# Patient Record
Sex: MALE | Employment: OTHER | URBAN - METROPOLITAN AREA
[De-identification: names, ages, dates, MRNs, and addresses within clinical notes are randomized per-mention and may not be internally consistent; named-entity substitution may affect disease eponyms.]

---

## 2019-10-02 ENCOUNTER — OFFICE VISIT (OUTPATIENT)
Dept: HEMATOLOGY/ONCOLOGY | Facility: CLINIC | Age: 84
End: 2019-10-02
Payer: MEDICARE

## 2019-10-02 VITALS
TEMPERATURE: 98 F | RESPIRATION RATE: 16 BRPM | HEIGHT: 69 IN | WEIGHT: 153.38 LBS | HEART RATE: 56 BPM | OXYGEN SATURATION: 97 % | SYSTOLIC BLOOD PRESSURE: 162 MMHG | BODY MASS INDEX: 22.72 KG/M2 | DIASTOLIC BLOOD PRESSURE: 58 MMHG

## 2019-10-02 DIAGNOSIS — C34.02 MALIGNANT NEOPLASM OF HILUS OF LEFT LUNG: Primary | ICD-10-CM

## 2019-10-02 PROCEDURE — 99204 OFFICE O/P NEW MOD 45 MIN: CPT | Mod: ICN,CMP,S$GLB, | Performed by: INTERNAL MEDICINE

## 2019-10-02 PROCEDURE — 99204 PR OFFICE/OUTPT VISIT, NEW, LEVL IV, 45-59 MIN: ICD-10-PCS | Mod: ICN,CMP,S$GLB, | Performed by: INTERNAL MEDICINE

## 2019-10-02 RX ORDER — OXYCODONE AND ACETAMINOPHEN 5; 325 MG/1; MG/1
TABLET ORAL
Refills: 0 | COMMUNITY
Start: 2019-09-12 | End: 2019-12-05

## 2019-10-02 RX ORDER — GABAPENTIN 100 MG/1
CAPSULE ORAL
Refills: 0 | COMMUNITY
Start: 2019-08-23

## 2019-10-02 RX ORDER — TRAMADOL HYDROCHLORIDE 50 MG/1
50 TABLET ORAL EVERY 8 HOURS PRN
COMMUNITY
End: 2019-12-05

## 2019-10-02 RX ORDER — TAMSULOSIN HYDROCHLORIDE 0.4 MG/1
CAPSULE ORAL
Refills: 2 | COMMUNITY
Start: 2019-08-12

## 2019-10-02 NOTE — PROGRESS NOTES
HISTORY AND PHYSICAL NOTE  Hematology Oncology  [facility]    Date of Service: 10/2/2019    PATIENT: Gerry Powell  : 1935 83 y.o. male  MRN [id number]     PCP: Jayce Guerra MD  Consult Requested By:  No att. providers found    Date of Service: 10/2/2019  -----------------------------------------------------    Chief Complaint: Lung Cancer      History of Present Illness     This is a 83 y.o. male patient with a history of There were no encounter diagnoses., who is referred to Hematology Oncology for newly diagnosed non-small cell lung cancer.  The patient's was incidentally found to have a left lung mass on a CT scan prior to planned hernia surgery.  Upon further questioning, the patient stated that he has left lower chest pain for several months which is not severe.  He also lost about 25 lb in the past several years.  The patient denies coughing short of breath.  He has no headache.    The patient underwent bronchoscopy with biopsy which shows non-small-cell lung cancer and no further classification was given.  A PET scan was ordered which shows a large hypermetabolic next chronic lesion in the left lower lobe measuring 10 cm and encasing the descending aorta and invading the healing a.m. and mediastinum.  There was also 1 cm hypermetabolic lesion in the right neck close to the thyroid.  MRI of the brain demonstrated no evidence of intracranial metastatic disease unfortunately he has the asymptomatic infarct.    The patient is ex-smoker and decreased smoking .    Oncologic History:      Oncologic History     Oncologic Treatment     Pathology      Oncology History:   No history exists.        Past Medical History:   Past Medical History:   Diagnosis Date    Arthritis     Cataract     removed     GERD (gastroesophageal reflux disease)     Heart murmur     Neuromuscular disorder     neuropathy in feet    Prostate cancer     Radiation seeds implanted    Stroke     patient  unaware when this happened. Showed on MRI       Past Surgical HIstory:   Past Surgical History:   Procedure Laterality Date    CATARACT EXTRACTION, BILATERAL  2011    HERNIA REPAIR  2016       Allergies:  Review of patient's allergies indicates:  No Known Allergies    Medications:  Current Outpatient Medications   Medication Sig Dispense Refill    gabapentin (NEURONTIN) 100 MG capsule TK TWO CS PO  TID  0    oxyCODONE-acetaminophen (PERCOCET) 5-325 mg per tablet TK 1 TO 2 TS PO Q 6 H PRN P  0    tamsulosin (FLOMAX) 0.4 mg Cap TK 1 C PO QD  2    traMADol (ULTRAM) 50 mg tablet Take 50 mg by mouth every 8 (eight) hours as needed for Pain.       No current facility-administered medications for this visit.        Family History:   Family History   Problem Relation Age of Onset    Colon cancer Mother     Kidney cancer Father     Bladder Cancer Father     Lung cancer Sister     Lymphoma Brother     No Known Problems Maternal Aunt     No Known Problems Maternal Uncle     No Known Problems Paternal Aunt     No Known Problems Paternal Uncle     No Known Problems Maternal Grandmother     No Known Problems Maternal Grandfather     No Known Problems Paternal Grandmother     No Known Problems Paternal Grandfather     Cancer Brother        Social History:  reports that he quit smoking about 34 years ago. His smoking use included cigarettes. He started smoking about 64 years ago. He has a 30.00 pack-year smoking history. He has never used smokeless tobacco. He reports that he does not drink alcohol or use drugs.    Review of Systemas  Constitutional: No change in weight, appetie, fatigue, fever, or night sweats  Eyes: No changes in vision  Ears, Nose, Mouth, Throat, and Face: No hearing problems, ear pain, rummy nose, or sore throat  Respiratory:  See HPI     Cardiovascular: No chest pain, palpitations or dizziness  Gastrointestinal: No abdominal pain, hematochezia, melena  Genitourinary: No dysuria, hematuria,  "nocturia, menstrual problems, post menopausal  Integumentary/Breast: No rashes or itching  Hematologic/Lymphatic: No anemia or bleeding abnormalities  Musculoskeletal: No joint or muscle abnormalities  Neurological: No sensory or motor problems, no headaches  Behavioral/Psych: No depression, anxiety, cognitive problems, or stress  Endocrine: No diabetes or thyroid problems  Allergic/Immunologic: See allergy above    Physical Exam      Vitals:   Vitals:    10/02/19 1001   BP: (!) 162/58   BP Location: Left arm   Patient Position: Sitting   BP Method: Large (Automatic)   Pulse: (!) 56   Resp: 16   Temp: 98.4 °F (36.9 °C)   TempSrc: Temporal   SpO2: 97%   Weight: 69.6 kg (153 lb 6.4 oz)   Height: 5' 9" (1.753 m)     BMI: Body mass index is 22.65 kg/m².  BSA Body surface area is 1.84 meters squared.  ECOG Performance Status:   ECOG SCORE         GENERAL APPEARANCE: Well developed, well nourished, in no acute distress.  SKIN: Inspection of the skin reveals no rashes, ulcerations or petechiae.  HEENT: The sclerae were anicteric and conjunctivae were pink and moist. Extraocular movements were intact and pupils were equal, round, and reactive to light with normal accommodation. External inspection of the ears and nose showed no scars, lesions, or masses. Lips, teeth, and gums showed normal mucosa. The oral mucosa, hard and soft palate, tongue and posterior pharynx were normal.  NECK: Supple and symmetric. There was no thyroid enlargement, and no tenderness, or masses were felt.  CRESPIRATORY: Normal AP diameter and normal contour without any kyphoscoliosis. LUNGS: Auscultation of the lungs revealed normal breath sounds without any other adventitious sounds or rubs.  CARDIOVASCULAR: There was a regular rate and rhythm without any murmurs, gallops, rubs. The carotid pulses were normal and 2+ bilaterally without bruits. Peripheral pulses were 2+ and symmetric.  GASTROINTESTINAL: Soft and nontender with normal bowel sounds. The " liver span was approximately 5-6 cm in the right midclavicular line by percussion. The liver edge was nontender. The spleen was not palpable. There were no inguinal or umbilical hernias noted. No ascites was noted.  LYMPH NODES: No lymphadenopathy was appreciated in the neck, axillae or groin.  MUSCULOSKELETAL: Gait was normal. There was no tenderness or effusions noted. Muscle strength and tone were normal. EXTREMITIES: No cyanosis, clubbing or edema.  NEUROLOGIC: Alert and oriented x 3. Normal affect. Gait was normal. Normal deep tendon reflexes with no pathological reflexes. Sensation to touch was normal.  PSYCHIATRIC: good mood, orientated to place, time and person     Labs and Imagings     No visits with results within 12 Month(s) from this visit.   Latest known visit with results is:   No results found for any previous visit.       Imaging:     Assessment:     Principle Problem: No primary diagnosis found.   Co-morbidity/Active Problems: There is no problem list on file for this patient.       Gerry Powell is a 83 y.o. male with PMH of There were no encounter diagnoses., with No primary diagnosis found.   Non-small-cell lung cancer.  Current staging is stage IIIA not resectable.  The right neck lesion is suspicious and biopsy is planned.  If the biopsy is consistent with metastatic lung cancer the patient will be stage IV.  We can request more genetic testing from the neck pathology if that proven to be metastatic lung cancer.    The patient have a good performance status on less significant weight loss    Plan:   --the overall plan will be concurrent chemo radiation therapy if pathological staging of the neck proven the patient have no distant metastatic disease.    If the patient have stage IV metastatic disease, we will request more genetic testing and PDL1 staining to determine the patient will be candidate for tyrosine kinase inhibitors versus checkpoint inhibitors.  --the patient may go through hernia  repair 1st before starting chemotherapy and radiation therapy.  --I had a thorough discussion regarding the overall treatment plan as well prognosis based on limited information with the patient and family.  They verbalize understanding and agreed to proceed.  --to MA Return to Clinic with appointment in 2 weeks      Signature    William Arguello M.D., Ph.D., [facility]  Hematology-Oncology    Signed 10/2/2019 11:47 AM   Note Ends Here

## 2019-10-16 ENCOUNTER — OFFICE VISIT (OUTPATIENT)
Dept: HEMATOLOGY/ONCOLOGY | Facility: CLINIC | Age: 84
End: 2019-10-16
Payer: MEDICARE

## 2019-10-16 VITALS
OXYGEN SATURATION: 97 % | BODY MASS INDEX: 23.4 KG/M2 | HEIGHT: 69 IN | SYSTOLIC BLOOD PRESSURE: 152 MMHG | DIASTOLIC BLOOD PRESSURE: 70 MMHG | TEMPERATURE: 98 F | RESPIRATION RATE: 12 BRPM | WEIGHT: 158 LBS | HEART RATE: 53 BPM

## 2019-10-16 DIAGNOSIS — C34.11 MALIGNANT NEOPLASM OF UPPER LOBE OF RIGHT LUNG: ICD-10-CM

## 2019-10-16 DIAGNOSIS — C34.02 MALIGNANT NEOPLASM OF HILUS OF LEFT LUNG: Primary | ICD-10-CM

## 2019-10-16 PROCEDURE — 99215 OFFICE O/P EST HI 40 MIN: CPT | Mod: S$GLB,,, | Performed by: INTERNAL MEDICINE

## 2019-10-16 PROCEDURE — 99215 PR OFFICE/OUTPT VISIT, EST, LEVL V, 40-54 MIN: ICD-10-PCS | Mod: S$GLB,,, | Performed by: INTERNAL MEDICINE

## 2019-10-16 RX ORDER — HYDROCODONE BITARTRATE AND ACETAMINOPHEN 10; 325 MG/1; MG/1
TABLET ORAL
Refills: 0 | COMMUNITY
Start: 2019-10-07 | End: 2019-12-27

## 2019-10-16 NOTE — PROGRESS NOTES
Hematology Oncology Progress Note    Hematology Oncology  [facility]     PATIENT: Gerry Powell  : 1935 84 y.o. male  MRN [id number]     PCP: Jayce Guerra MD  Consult Requested By:  No att. providers found    Date of Service: 10/16/2019    Subjective:   Interim History:  maligant neoplasm of hilus of left lung    Gerry Powell is here for to followup non-small-cell lung cancer.  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    Oncology History:  This is a 83 y.o. male patient with a history of There were no encounter diagnoses., who is referred to Hematology Oncology for newly diagnosed non-small cell lung cancer.  The patient's was incidentally found to have a left lung mass on a CT scan prior to planned hernia surgery.  Upon further questioning, the patient stated that he has left lower chest pain for several months which is not severe.  He also lost about 25 lb in the past several years.  The patient denies coughing short of breath.  He has no headache.     The patient underwent bronchoscopy with biopsy which shows non-small-cell lung cancer and no further classification was given.  A PET scan was ordered which shows a large hypermetabolic next chronic lesion in the left lower lobe measuring 10 cm and encasing the descending aorta and invading the healing a.m. and mediastinum.  There was also 1 cm hypermetabolic lesion in the right neck close to the thyroid.  MRI of the brain demonstrated no evidence of intracranial metastatic disease unfortunately he has the asymptomatic infarct.     The patient is ex-smoker and decreased smoking 1985.     Malignant neoplasm of upper lobe of right lung    10/16/2019 Initial Diagnosis     Malignant neoplasm of upper lobe of right lung      10/22/2019 -  Chemotherapy     Treatment Summary   Plan Name: OP NSCLC PACLITAXEL + CARBOPLATIN (AUC) QW + RADIATION  Treatment Goal: Curative  Status: Active  Start Date: 10/22/2019 (Planned)  End Date:  11/26/2019 (Planned)  Provider: Jeny Ordoñez NP  Chemotherapy: CARBOplatin (PARAPLATIN) in sodium chloride 0.9% 250 mL chemo infusion, , Intravenous, Clinic/HOD 1 time, 0 of 6 cycles  PACLitaxel (TAXOL) 45 mg/m2 = 84 mg in sodium chloride 0.9% 250 mL chemo infusion, 45 mg/m2, Intravenous, Clinic/HOD 1 time, 0 of 6 cycles         Oncologic History:      Oncologic History     Oncologic Treatment     Pathology      Past Medical History:   Past Medical History:   Diagnosis Date    Arthritis     Cataract     removed 2011    GERD (gastroesophageal reflux disease)     Heart murmur     Lung cancer 09/2019    Neuromuscular disorder     neuropathy in feet    Prostate cancer 2007    Radiation seeds implanted    Stroke     patient unaware when this happened. Showed on MRI       Past Surgical HIstory:   Past Surgical History:   Procedure Laterality Date    CATARACT EXTRACTION, BILATERAL  2011    HERNIA REPAIR  2016       Allergies:  Review of patient's allergies indicates:  No Known Allergies    Medications:  Current Outpatient Medications   Medication Sig Dispense Refill    gabapentin (NEURONTIN) 100 MG capsule TK TWO CS PO  TID  0    HYDROcodone-acetaminophen (NORCO)  mg per tablet TK 1 T PO  Q 6 H PRN P  0    tamsulosin (FLOMAX) 0.4 mg Cap TK 1 C PO QD  2    traMADol (ULTRAM) 50 mg tablet Take 50 mg by mouth every 8 (eight) hours as needed for Pain.      oxyCODONE-acetaminophen (PERCOCET) 5-325 mg per tablet TK 1 TO 2 TS PO Q 6 H PRN P  0     No current facility-administered medications for this visit.        Family History:   Family History   Problem Relation Age of Onset    Colon cancer Mother     Kidney cancer Father     Bladder Cancer Father     Lung cancer Sister     Lymphoma Brother     No Known Problems Maternal Aunt     No Known Problems Maternal Uncle     No Known Problems Paternal Aunt     No Known Problems Paternal Uncle     No Known Problems Maternal Grandmother     No Known  "Problems Maternal Grandfather     No Known Problems Paternal Grandmother     No Known Problems Paternal Grandfather     Cancer Brother        Social History:  reports that he quit smoking about 34 years ago. His smoking use included cigarettes. He started smoking about 64 years ago. He has a 30.00 pack-year smoking history. He has never used smokeless tobacco. He reports that he does not drink alcohol or use drugs.    Review of Systemas  Constitutional: No change in weight, appetie, fatigue, fever, or night sweats  Eyes: No changes in vision  Ears, Nose, Mouth, Throat, and Face: No hearing problems, ear pain, rummy nose, or sore throat  Respiratory: No shortness of breath or cough  Cardiovascular: No chest pain, palpitations or dizziness  Gastrointestinal: No abdominal pain, hematochezia, melena  Genitourinary: No dysuria, hematuria, nocturia, menstrual problems, post menopausal  Integumentary/Breast: No rashes or itching  Hematologic/Lymphatic: No anemia or bleeding abnormalities  Musculoskeletal: No joint or muscle abnormalities  Neurological: No sensory or motor problems, no headaches  Behavioral/Psych: No depression, anxiety, cognitive problems, or stress  Endocrine: No diabetes or thyroid problems  Allergic/Immunologic: See allergy above    Physical Exam      Vitals:   Vitals:    10/16/19 0949   BP: (!) 152/70   BP Location: Right arm   Patient Position: Sitting   BP Method: Large (Automatic)   Pulse: (!) 53   Resp: 12   Temp: 97.5 °F (36.4 °C)   TempSrc: Temporal   SpO2: 97%   Weight: 71.7 kg (158 lb)   Height: 5' 9" (1.753 m)     BMI: Body mass index is 23.33 kg/m².  BSA Body surface area is 1.87 meters squared.  ECOG Performance Status:   ECOG SCORE    0 - Fully active-able to carry on all pre-disease performance without restriction       GENERAL APPEARANCE: Well developed, well nourished, in no acute distress.  SKIN: Inspection of the skin reveals no rashes, ulcerations or petechiae.  HEENT: The sclerae " were anicteric and conjunctivae were pink and moist. Extraocular movements were intact and pupils were equal, round, and reactive to light with normal accommodation. External inspection of the ears and nose showed no scars, lesions, or masses. Lips, teeth, and gums showed normal mucosa. The oral mucosa, hard and soft palate, tongue and posterior pharynx were normal.  NECK: Supple and symmetric. There was no thyroid enlargement, and no tenderness, or masses were felt.  CRESPIRATORY: Normal AP diameter and normal contour without any kyphoscoliosis. LUNGS: Auscultation of the lungs revealed normal breath sounds without any other adventitious sounds or rubs.  CARDIOVASCULAR: There was a regular rate and rhythm without any murmurs, gallops, rubs. The carotid pulses were normal and 2+ bilaterally without bruits. Peripheral pulses were 2+ and symmetric.  GASTROINTESTINAL: Soft and nontender with normal bowel sounds. The liver span was approximately 5-6 cm in the right midclavicular line by percussion. The liver edge was nontender. The spleen was not palpable. There were no inguinal or umbilical hernias noted. No ascites was noted.  LYMPH NODES: No lymphadenopathy was appreciated in the neck, axillae or groin.  MUSCULOSKELETAL: Gait was normal. There was no tenderness or effusions noted. Muscle strength and tone were normal. EXTREMITIES: No cyanosis, clubbing or edema.  NEUROLOGIC: Alert and oriented x 3. Normal affect. Gait was normal. Normal deep tendon reflexes with no pathological reflexes. Sensation to touch was normal.  PSYCHIATRIC: good mood, orientated to place, time and person     Labs and Imagings     No visits with results within 12 Month(s) from this visit.   Latest known visit with results is:   No results found for any previous visit.       Imaging:     Assessment:     Principle Problem: Malignant neoplasm of hilus of left lung [C34.02]   Co-morbidity/Active Problems:   Patient Active Problem List   Diagnosis     Malignant neoplasm of upper lobe of right lung        Gerry Powell is a 84 y.o. male with PMH of The primary encounter diagnosis was Malignant neoplasm of hilus of left lung. A diagnosis of Malignant neoplasm of upper lobe of right lung was also pertinent to this visit., with Malignant neoplasm of hilus of left lung [C34.02]   Non-small-cell lung cancer with EGFR ALK Ros1 negative molecular features; there was no enough specimen to stain PDL1    There was no distant metastatic disease and the biopsy of steroids shows papillary carcinoma      Plan:   Overall Plan:  Chemoradiation therapy followed by adjuvant immunotherapy    To Do:    --Labs: CBC CMP every week during chemotherapy    --Proceed with Rx carboplatin and Taxol weekly  --Return to Clinic with appointment in 1 week  Non-small-cell lung cancer  Signature    William Arguello M.D., Ph.D., [facility]  Hematology-Oncology    Signed 10/16/2019 3:52 PM   Note Ends Here

## 2019-10-23 ENCOUNTER — OFFICE VISIT (OUTPATIENT)
Dept: HEMATOLOGY/ONCOLOGY | Facility: CLINIC | Age: 84
End: 2019-10-23
Payer: MEDICARE

## 2019-10-23 VITALS
BODY MASS INDEX: 23.49 KG/M2 | TEMPERATURE: 98 F | SYSTOLIC BLOOD PRESSURE: 166 MMHG | DIASTOLIC BLOOD PRESSURE: 74 MMHG | HEART RATE: 56 BPM | HEIGHT: 69 IN | WEIGHT: 158.63 LBS | OXYGEN SATURATION: 98 % | RESPIRATION RATE: 14 BRPM

## 2019-10-23 DIAGNOSIS — C34.11 MALIGNANT NEOPLASM OF UPPER LOBE OF RIGHT LUNG: Primary | ICD-10-CM

## 2019-10-23 PROCEDURE — 99215 PR OFFICE/OUTPT VISIT, EST, LEVL V, 40-54 MIN: ICD-10-PCS | Mod: S$GLB,,, | Performed by: INTERNAL MEDICINE

## 2019-10-23 PROCEDURE — 99215 OFFICE O/P EST HI 40 MIN: CPT | Mod: S$GLB,,, | Performed by: INTERNAL MEDICINE

## 2019-10-23 RX ORDER — FAMOTIDINE 10 MG/ML
20 INJECTION INTRAVENOUS
Status: CANCELLED | OUTPATIENT
Start: 2019-10-23

## 2019-10-23 RX ORDER — EPINEPHRINE 0.3 MG/.3ML
0.3 INJECTION SUBCUTANEOUS ONCE AS NEEDED
Status: CANCELLED | OUTPATIENT
Start: 2019-10-23

## 2019-10-23 RX ORDER — HEPARIN 100 UNIT/ML
500 SYRINGE INTRAVENOUS
Status: CANCELLED | OUTPATIENT
Start: 2019-10-23

## 2019-10-23 RX ORDER — DIPHENHYDRAMINE HYDROCHLORIDE 50 MG/ML
50 INJECTION INTRAMUSCULAR; INTRAVENOUS ONCE AS NEEDED
Status: CANCELLED | OUTPATIENT
Start: 2019-10-23

## 2019-10-23 RX ORDER — SODIUM CHLORIDE 0.9 % (FLUSH) 0.9 %
10 SYRINGE (ML) INJECTION
Status: CANCELLED | OUTPATIENT
Start: 2019-10-23

## 2019-10-23 NOTE — PROGRESS NOTES
Hematology Oncology Progress Note    Hematology Oncology  [facility]     PATIENT: Gerry Powell  : 1935 84 y.o. male  MRN [id number]     PCP: Jayce Guerra MD  Consult Requested By:  No att. providers found    Date of Service: 10/23/2019    Subjective:   Interim History:  Lung Cancer (Hilus of left lung )    Gerry Powell is here for to followup to start chemoxrt. He went to Rad Onc today    Oncology History:     Malignant neoplasm of upper lobe of right lung    10/16/2019 Initial Diagnosis     Malignant neoplasm of upper lobe of right lung      10/23/2019 -  Chemotherapy     Treatment Summary   Plan Name: OP NSCLC PACLITAXEL + CARBOPLATIN (AUC) QW + RADIATION  Treatment Goal: Curative  Status: Active  Start Date: 10/23/2019 (Planned)  End Date: 2019 (Planned)  Provider: Jeny Ordoñez NP  Chemotherapy: CARBOplatin (PARAPLATIN) 165 mg in sodium chloride 0.9% 250 mL chemo infusion, 165 mg (original dose ), Intravenous, Clinic/HOD 1 time, 0 of 6 cycles  Dose modification:   (Cycle 1, Reason: MD Discretion)  PACLitaxel (TAXOL) 45 mg/m2 = 84 mg in sodium chloride 0.9% 250 mL chemo infusion, 45 mg/m2 = 84 mg, Intravenous, Clinic/HOD 1 time, 0 of 6 cycles         Oncologic History:   This is a 83 y.o. male patient with a history of There were no encounter diagnoses., who is referred to Hematology Oncology for newly diagnosed non-small cell lung cancer.  The patient's was incidentally found to have a left lung mass on a CT scan prior to planned hernia surgery.  Upon further questioning, the patient stated that he has left lower chest pain for several months which is not severe.  He also lost about 25 lb in the past several years.  The patient denies coughing short of breath.  He has no headache.     The patient underwent bronchoscopy with biopsy which shows non-small-cell lung cancer and no further classification was given.  A PET scan was ordered which shows a large hypermetabolic next chronic  lesion in the left lower lobe measuring 10 cm and encasing the descending aorta and invading the healing a.m. and mediastinum.  There was also 1 cm hypermetabolic lesion in the right neck close to the thyroid.  MRI of the brain demonstrated no evidence of intracranial metastatic disease unfortunately he has the asymptomatic infarct.     The patient is ex-smoker and decreased smoking 1985.                Oncologic History     Oncologic Treatment     Pathology      Past Medical History:   Past Medical History:   Diagnosis Date    Arthritis     Cataract     removed 2011    GERD (gastroesophageal reflux disease)     Heart murmur     Lung cancer 09/2019    Neuromuscular disorder     neuropathy in feet    Prostate cancer 2007    Radiation seeds implanted    Stroke     patient unaware when this happened. Showed on MRI       Past Surgical HIstory:   Past Surgical History:   Procedure Laterality Date    CATARACT EXTRACTION, BILATERAL  2011    HERNIA REPAIR  2016       Allergies:  Review of patient's allergies indicates:  No Known Allergies    Medications:  Current Outpatient Medications   Medication Sig Dispense Refill    gabapentin (NEURONTIN) 100 MG capsule TK TWO CS PO  TID  0    HYDROcodone-acetaminophen (NORCO)  mg per tablet TK 1 T PO  Q 6 H PRN P  0    oxyCODONE-acetaminophen (PERCOCET) 5-325 mg per tablet TK 1 TO 2 TS PO Q 6 H PRN P  0    tamsulosin (FLOMAX) 0.4 mg Cap TK 1 C PO QD  2    traMADol (ULTRAM) 50 mg tablet Take 50 mg by mouth every 8 (eight) hours as needed for Pain.       No current facility-administered medications for this visit.        Family History:   Family History   Problem Relation Age of Onset    Colon cancer Mother     Kidney cancer Father     Bladder Cancer Father     Lung cancer Sister     Lymphoma Brother     No Known Problems Maternal Aunt     No Known Problems Maternal Uncle     No Known Problems Paternal Aunt     No Known Problems Paternal Uncle     No  "Known Problems Maternal Grandmother     No Known Problems Maternal Grandfather     No Known Problems Paternal Grandmother     No Known Problems Paternal Grandfather     Cancer Brother        Social History:  reports that he quit smoking about 34 years ago. His smoking use included cigarettes. He started smoking about 64 years ago. He has a 30.00 pack-year smoking history. He has never used smokeless tobacco. He reports that he does not drink alcohol or use drugs.    Review of Systemas  Constitutional: No change in weight, appetie, fatigue, fever, or night sweats  Eyes: No changes in vision  Ears, Nose, Mouth, Throat, and Face: No hearing problems, ear pain, rummy nose, or sore throat  Respiratory: No shortness of breath or cough  Cardiovascular: No chest pain, palpitations or dizziness  Gastrointestinal: No abdominal pain, hematochezia, melena  Genitourinary: No dysuria, hematuria, nocturia, menstrual problems, post menopausal  Integumentary/Breast: No rashes or itching  Hematologic/Lymphatic: No anemia or bleeding abnormalities  Musculoskeletal: No joint or muscle abnormalities  Neurological: No sensory or motor problems, no headaches  Behavioral/Psych: No depression, anxiety, cognitive problems, or stress  Endocrine: No diabetes or thyroid problems  Allergic/Immunologic: See allergy above    Physical Exam      Vitals:   Vitals:    10/23/19 0913   BP: (!) 166/74   BP Location: Left arm   Patient Position: Sitting   BP Method: Large (Automatic)   Pulse: (!) 56   Resp: 14   Temp: 97.7 °F (36.5 °C)   TempSrc: Temporal   SpO2: 98%   Weight: 71.9 kg (158 lb 9.6 oz)   Height: 5' 9" (1.753 m)     BMI: Body mass index is 23.42 kg/m².  BSA Body surface area is 1.87 meters squared.  ECOG Performance Status:   ECOG SCORE    0 - Fully active-able to carry on all pre-disease performance without restriction       GENERAL APPEARANCE: Well developed, well nourished, in no acute distress.  SKIN: Inspection of the skin reveals " no rashes, ulcerations or petechiae.  HEENT: The sclerae were anicteric and conjunctivae were pink and moist. Extraocular movements were intact and pupils were equal, round, and reactive to light with normal accommodation. External inspection of the ears and nose showed no scars, lesions, or masses. Lips, teeth, and gums showed normal mucosa. The oral mucosa, hard and soft palate, tongue and posterior pharynx were normal.  NECK: Supple and symmetric. There was no thyroid enlargement, and no tenderness, or masses were felt.  CRESPIRATORY: Normal AP diameter and normal contour without any kyphoscoliosis. LUNGS: Auscultation of the lungs revealed normal breath sounds without any other adventitious sounds or rubs.  CARDIOVASCULAR: There was a regular rate and rhythm without any murmurs, gallops, rubs. The carotid pulses were normal and 2+ bilaterally without bruits. Peripheral pulses were 2+ and symmetric.  GASTROINTESTINAL: Soft and nontender with normal bowel sounds. The liver span was approximately 5-6 cm in the right midclavicular line by percussion. The liver edge was nontender. The spleen was not palpable. There were no inguinal or umbilical hernias noted. No ascites was noted.  LYMPH NODES: No lymphadenopathy was appreciated in the neck, axillae or groin.  MUSCULOSKELETAL: Gait was normal. There was no tenderness or effusions noted. Muscle strength and tone were normal. EXTREMITIES: No cyanosis, clubbing or edema.  NEUROLOGIC: Alert and oriented x 3. Normal affect. Gait was normal. Normal deep tendon reflexes with no pathological reflexes. Sensation to touch was normal.  PSYCHIATRIC: good mood, orientated to place, time and person     Labs and Imagings     No visits with results within 12 Month(s) from this visit.   Latest known visit with results is:   No results found for any previous visit.       Imaging:     Assessment:     Principle Problem: Malignant neoplasm of upper lobe of right lung [C34.11]    Co-morbidity/Active Problems:   Patient Active Problem List   Diagnosis    Malignant neoplasm of upper lobe of right lung        Gerry Powell is a 84 y.o. 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

## 2019-10-28 DIAGNOSIS — C34.11 MALIGNANT NEOPLASM OF UPPER LOBE OF RIGHT LUNG: Primary | ICD-10-CM

## 2019-10-28 RX ORDER — ONDANSETRON HYDROCHLORIDE 8 MG/1
8 TABLET, FILM COATED ORAL EVERY 8 HOURS PRN
Qty: 30 TABLET | Refills: 2 | Status: SHIPPED | OUTPATIENT
Start: 2019-10-28 | End: 2020-10-27

## 2019-10-30 ENCOUNTER — OFFICE VISIT (OUTPATIENT)
Dept: HEMATOLOGY/ONCOLOGY | Facility: CLINIC | Age: 84
End: 2019-10-30
Payer: MEDICARE

## 2019-10-30 VITALS
OXYGEN SATURATION: 97 % | TEMPERATURE: 98 F | HEIGHT: 69 IN | WEIGHT: 156 LBS | BODY MASS INDEX: 23.11 KG/M2 | RESPIRATION RATE: 10 BRPM | HEART RATE: 70 BPM | SYSTOLIC BLOOD PRESSURE: 139 MMHG | DIASTOLIC BLOOD PRESSURE: 72 MMHG

## 2019-10-30 DIAGNOSIS — C34.11 MALIGNANT NEOPLASM OF UPPER LOBE OF RIGHT LUNG: Primary | ICD-10-CM

## 2019-10-30 PROCEDURE — 99215 PR OFFICE/OUTPT VISIT, EST, LEVL V, 40-54 MIN: ICD-10-PCS | Mod: S$GLB,,, | Performed by: INTERNAL MEDICINE

## 2019-10-30 PROCEDURE — 99215 OFFICE O/P EST HI 40 MIN: CPT | Mod: S$GLB,,, | Performed by: INTERNAL MEDICINE

## 2019-10-30 RX ORDER — FAMOTIDINE 10 MG/ML
20 INJECTION INTRAVENOUS
Status: CANCELLED | OUTPATIENT
Start: 2019-10-30

## 2019-10-30 RX ORDER — HEPARIN 100 UNIT/ML
500 SYRINGE INTRAVENOUS
Status: CANCELLED | OUTPATIENT
Start: 2019-10-30

## 2019-10-30 RX ORDER — EPINEPHRINE 0.3 MG/.3ML
0.3 INJECTION SUBCUTANEOUS ONCE AS NEEDED
Status: CANCELLED | OUTPATIENT
Start: 2019-10-30

## 2019-10-30 RX ORDER — FENTANYL 25 UG/1
PATCH TRANSDERMAL
Refills: 0 | COMMUNITY
Start: 2019-10-24 | End: 2019-12-05 | Stop reason: SINTOL

## 2019-10-30 RX ORDER — DIPHENHYDRAMINE HYDROCHLORIDE 50 MG/ML
50 INJECTION INTRAMUSCULAR; INTRAVENOUS ONCE AS NEEDED
Status: CANCELLED | OUTPATIENT
Start: 2019-10-30

## 2019-10-30 RX ORDER — SODIUM CHLORIDE 0.9 % (FLUSH) 0.9 %
10 SYRINGE (ML) INJECTION
Status: CANCELLED | OUTPATIENT
Start: 2019-10-30

## 2019-11-04 ENCOUNTER — OFFICE VISIT (OUTPATIENT)
Dept: HEMATOLOGY/ONCOLOGY | Facility: CLINIC | Age: 84
End: 2019-11-04
Payer: MEDICARE

## 2019-11-04 VITALS
HEIGHT: 69 IN | DIASTOLIC BLOOD PRESSURE: 72 MMHG | BODY MASS INDEX: 22.36 KG/M2 | WEIGHT: 151 LBS | HEART RATE: 64 BPM | TEMPERATURE: 99 F | RESPIRATION RATE: 14 BRPM | SYSTOLIC BLOOD PRESSURE: 145 MMHG | OXYGEN SATURATION: 96 %

## 2019-11-04 DIAGNOSIS — C34.11 MALIGNANT NEOPLASM OF UPPER LOBE OF RIGHT LUNG: ICD-10-CM

## 2019-11-04 DIAGNOSIS — E86.0 DEHYDRATION: Primary | ICD-10-CM

## 2019-11-04 PROCEDURE — 99215 PR OFFICE/OUTPT VISIT, EST, LEVL V, 40-54 MIN: ICD-10-PCS | Mod: S$GLB,,, | Performed by: INTERNAL MEDICINE

## 2019-11-04 PROCEDURE — 99215 OFFICE O/P EST HI 40 MIN: CPT | Mod: S$GLB,,, | Performed by: INTERNAL MEDICINE

## 2019-11-04 NOTE — PROGRESS NOTES
Hematology Oncology Progress Note    Hematology Oncology  [facility]     PATIENT: Gerry Powell  : 1935 84 y.o. male  MRN [id number]     PCP: Jayce Guerra MD  Consult Requested By:  No att. providers found    Date of Service: 2019    Subjective:   Interim History:  Lung Cancer (upper right lobe of right lung )    Gerry Powell is here for to followup to start chemoxrt. Doing well. No dysphagia  Add on today; poor po intake, pain is not better, on dura 25.     Oncology History:     Malignant neoplasm of upper lobe of right lung    10/16/2019 Initial Diagnosis     Malignant neoplasm of upper lobe of right lung      10/23/2019 -  Chemotherapy     Treatment Summary   Plan Name: OP NSCLC PACLITAXEL + CARBOPLATIN (AUC) QW + RADIATION  Treatment Goal: Curative  Status: Active  Start Date: 10/23/2019  End Date: 2019 (Planned)  Provider: Jeny Ordoñez NP  Chemotherapy: CARBOplatin (PARAPLATIN) in sodium chloride 0.9% 250 mL chemo infusion,  (original dose 166.2 mg), Intravenous, Clinic/HOD 1 time, 2 of 6 cycles  Dose modification:   (original dose 166.2 mg, Cycle 1, Reason: MD Discretion)  PACLitaxel (TAXOL) 45 mg/m2 = 84 mg in sodium chloride 0.9% 250 mL chemo infusion, 45 mg/m2 = 84 mg, Intravenous, Clinic/HOD 1 time, 2 of 6 cycles         Oncologic History:   This is a 83 y.o. male patient with a history of There were no encounter diagnoses., who is referred to Hematology Oncology for newly diagnosed non-small cell lung cancer.  The patient's was incidentally found to have a left lung mass on a CT scan prior to planned hernia surgery.  Upon further questioning, the patient stated that he has left lower chest pain for several months which is not severe.  He also lost about 25 lb in the past several years.  The patient denies coughing short of breath.  He has no headache.     The patient underwent bronchoscopy with biopsy which shows non-small-cell lung cancer and no further classification was  given.  A PET scan was ordered which shows a large hypermetabolic next chronic lesion in the left lower lobe measuring 10 cm and encasing the descending aorta and invading the healing a.m. and mediastinum.  There was also 1 cm hypermetabolic lesion in the right neck close to the thyroid.  MRI of the brain demonstrated no evidence of intracranial metastatic disease unfortunately he has the asymptomatic infarct.     The patient is ex-smoker and decreased smoking 1985.  --XRT/Car/Taxol 10/25/2019                  Oncologic History     Oncologic Treatment     Pathology      Past Medical History:   Past Medical History:   Diagnosis Date    Arthritis     Cataract     removed 2011    GERD (gastroesophageal reflux disease)     Heart murmur     Lung cancer 09/2019    Neuromuscular disorder     neuropathy in feet    Prostate cancer 2007    Radiation seeds implanted    Stroke     patient unaware when this happened. Showed on MRI       Past Surgical HIstory:   Past Surgical History:   Procedure Laterality Date    CATARACT EXTRACTION, BILATERAL  2011    HERNIA REPAIR  2016       Allergies:  Review of patient's allergies indicates:  No Known Allergies    Medications:  Current Outpatient Medications   Medication Sig Dispense Refill    fentaNYL (DURAGESIC) 25 mcg/hr UNW AND JESSICA 1 PA TO SKIN Q 72 H  0    gabapentin (NEURONTIN) 100 MG capsule TK TWO CS PO  TID  0    HYDROcodone-acetaminophen (NORCO)  mg per tablet TK 1 T PO  Q 6 H PRN P  0    ondansetron (ZOFRAN) 8 MG tablet Take 1 tablet (8 mg total) by mouth every 8 (eight) hours as needed for Nausea. 30 tablet 2    oxyCODONE-acetaminophen (PERCOCET) 5-325 mg per tablet TK 1 TO 2 TS PO Q 6 H PRN P  0    tamsulosin (FLOMAX) 0.4 mg Cap TK 1 C PO QD  2    traMADol (ULTRAM) 50 mg tablet Take 50 mg by mouth every 8 (eight) hours as needed for Pain.       No current facility-administered medications for this visit.        Family History:   Family History  "  Problem Relation Age of Onset    Colon cancer Mother     Kidney cancer Father     Bladder Cancer Father     Lung cancer Sister     Lymphoma Brother     No Known Problems Maternal Aunt     No Known Problems Maternal Uncle     No Known Problems Paternal Aunt     No Known Problems Paternal Uncle     No Known Problems Maternal Grandmother     No Known Problems Maternal Grandfather     No Known Problems Paternal Grandmother     No Known Problems Paternal Grandfather     Cancer Brother        Social History:  reports that he quit smoking about 34 years ago. His smoking use included cigarettes. He started smoking about 64 years ago. He has a 30.00 pack-year smoking history. He has never used smokeless tobacco. He reports that he does not drink alcohol or use drugs.    Review of Systemas  Constitutional: No change in weight, appetie, fatigue, fever, or night sweats  Eyes: No changes in vision  Ears, Nose, Mouth, Throat, and Face: No hearing problems, ear pain, rummy nose, or sore throat  Respiratory: No shortness of breath or cough  Cardiovascular: No chest pain, palpitations or dizziness  Gastrointestinal: No abdominal pain, hematochezia, melena  Genitourinary: No dysuria, hematuria, nocturia, menstrual problems, post menopausal  Integumentary/Breast: No rashes or itching  Hematologic/Lymphatic: No anemia or bleeding abnormalities  Musculoskeletal: No joint or muscle abnormalities  Neurological: No sensory or motor problems, no headaches  Behavioral/Psych: No depression, anxiety, cognitive problems, or stress  Endocrine: No diabetes or thyroid problems  Allergic/Immunologic: See allergy above    Physical Exam      Vitals:   Vitals:    11/04/19 1035   BP: (!) 145/72   BP Location: Left arm   Patient Position: Sitting   BP Method: Large (Automatic)   Pulse: 64   Resp: 14   Temp: 98.6 °F (37 °C)   TempSrc: Temporal   SpO2: 96%   Weight: 68.5 kg (151 lb)   Height: 5' 9" (1.753 m)     BMI: Body mass index is " 22.3 kg/m².  BSA Body surface area is 1.83 meters squared.  ECOG Performance Status:   ECOG SCORE    0 - Fully active-able to carry on all pre-disease performance without restriction       GENERAL APPEARANCE: Well developed, well nourished, in no acute distress.  SKIN: Inspection of the skin reveals no rashes, ulcerations or petechiae.  HEENT: The sclerae were anicteric and conjunctivae were pink and moist. Extraocular movements were intact and pupils were equal, round, and reactive to light with normal accommodation. External inspection of the ears and nose showed no scars, lesions, or masses. Lips, teeth, and gums showed normal mucosa. The oral mucosa, hard and soft palate, tongue and posterior pharynx were normal.  NECK: Supple and symmetric. There was no thyroid enlargement, and no tenderness, or masses were felt.  CRESPIRATORY: Normal AP diameter and normal contour without any kyphoscoliosis. LUNGS: Auscultation of the lungs revealed normal breath sounds without any other adventitious sounds or rubs.  CARDIOVASCULAR: There was a regular rate and rhythm without any murmurs, gallops, rubs. The carotid pulses were normal and 2+ bilaterally without bruits. Peripheral pulses were 2+ and symmetric.  GASTROINTESTINAL: Soft and nontender with normal bowel sounds. The liver span was approximately 5-6 cm in the right midclavicular line by percussion. The liver edge was nontender. The spleen was not palpable. There were no inguinal or umbilical hernias noted. No ascites was noted.  LYMPH NODES: No lymphadenopathy was appreciated in the neck, axillae or groin.  MUSCULOSKELETAL: Gait was normal. There was no tenderness or effusions noted. Muscle strength and tone were normal. EXTREMITIES: No cyanosis, clubbing or edema.  NEUROLOGIC: Alert and oriented x 3. Normal affect. Gait was normal. Normal deep tendon reflexes with no pathological reflexes. Sensation to touch was normal.  PSYCHIATRIC: good mood, orientated to place,  time and person     Labs and Imagings     No visits with results within 12 Month(s) from this visit.   Latest known visit with results is:   No results found for any previous visit.       Imaging:     Assessment:     Principle Problem: Dehydration [E86.0]   Co-morbidity/Active Problems:   Patient Active Problem List   Diagnosis    Malignant neoplasm of upper lobe of right lung        Gerry Powell is a 84 y.o. male with PMH of The primary encounter diagnosis was Dehydration. 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; there was no enough specimen to stain PDL1     There was no distant metastatic disease   the biopsy of  Thyroid  shows papillary carcinoma  ECOG 0-1  Dehydration   Anorexia     Plan:   Overall Plan:  Chemoradiation therapy followed by adjuvant immunotherapy     To Do: suggest dura 50 ug( prescribed by Dr Rodriguez), OK with 2L NS today, will come back 11/6 reassess, may use [facility] compounding ofr v/n; encourage Ensurance     --Labs: CBC CMP every week during chemotherapy     --Proceed with Rx carboplatin and Taxol this  Wednesday weekly (started  10/23)  --Return to Clinic with appointment in 11/6    Greg Arguello M.D., Ph.D., [facility]  Hematology-Oncology    Signed 11/4/2019 9:44 AM   Note Ends Here

## 2019-11-06 ENCOUNTER — OFFICE VISIT (OUTPATIENT)
Dept: HEMATOLOGY/ONCOLOGY | Facility: CLINIC | Age: 84
End: 2019-11-06
Payer: MEDICARE

## 2019-11-06 VITALS
DIASTOLIC BLOOD PRESSURE: 62 MMHG | WEIGHT: 155.38 LBS | TEMPERATURE: 98 F | SYSTOLIC BLOOD PRESSURE: 128 MMHG | HEART RATE: 56 BPM | BODY MASS INDEX: 23.01 KG/M2 | OXYGEN SATURATION: 96 % | RESPIRATION RATE: 16 BRPM | HEIGHT: 69 IN

## 2019-11-06 DIAGNOSIS — C34.11 MALIGNANT NEOPLASM OF UPPER LOBE OF RIGHT LUNG: Primary | ICD-10-CM

## 2019-11-06 PROCEDURE — 99215 PR OFFICE/OUTPT VISIT, EST, LEVL V, 40-54 MIN: ICD-10-PCS | Mod: S$GLB,,, | Performed by: INTERNAL MEDICINE

## 2019-11-06 PROCEDURE — 99215 OFFICE O/P EST HI 40 MIN: CPT | Mod: S$GLB,,, | Performed by: INTERNAL MEDICINE

## 2019-11-06 RX ORDER — DIPHENHYDRAMINE HYDROCHLORIDE 50 MG/ML
50 INJECTION INTRAMUSCULAR; INTRAVENOUS ONCE AS NEEDED
Status: CANCELLED | OUTPATIENT
Start: 2019-11-06

## 2019-11-06 RX ORDER — SODIUM CHLORIDE 0.9 % (FLUSH) 0.9 %
10 SYRINGE (ML) INJECTION
Status: CANCELLED | OUTPATIENT
Start: 2019-11-06

## 2019-11-06 RX ORDER — EPINEPHRINE 0.3 MG/.3ML
0.3 INJECTION SUBCUTANEOUS ONCE AS NEEDED
Status: CANCELLED | OUTPATIENT
Start: 2019-11-06

## 2019-11-06 RX ORDER — FAMOTIDINE 10 MG/ML
20 INJECTION INTRAVENOUS
Status: CANCELLED | OUTPATIENT
Start: 2019-11-06

## 2019-11-06 RX ORDER — HEPARIN 100 UNIT/ML
500 SYRINGE INTRAVENOUS
Status: CANCELLED | OUTPATIENT
Start: 2019-11-06

## 2019-11-06 NOTE — PROGRESS NOTES
Hematology Oncology Progress Note    Hematology Oncology  [facility]     PATIENT: Gerry Powell  : 1935 84 y.o. male  MRN [id number]     PCP: Jayce Guerra MD  Consult Requested By:  No att. providers found    Date of Service: 2019    Subjective:   Interim History:  Lung Cancer    Gerry Powell is here for to followup to start chemoxrt.doing better after rehdration.   Oncology History:     Malignant neoplasm of upper lobe of right lung    10/16/2019 Initial Diagnosis     Malignant neoplasm of upper lobe of right lung      10/23/2019 -  Chemotherapy     Treatment Summary   Plan Name: OP NSCLC PACLITAXEL + CARBOPLATIN (AUC) QW + RADIATION  Treatment Goal: Curative  Status: Active  Start Date: 10/23/2019  End Date: 2019 (Planned)  Provider: Jeny Ordoñez NP  Chemotherapy: CARBOplatin (PARAPLATIN) in sodium chloride 0.9% 250 mL chemo infusion,  (original dose 166.2 mg), Intravenous, Clinic/HOD 1 time, 2 of 6 cycles  Dose modification:   (original dose 166.2 mg, Cycle 1, Reason: MD Discretion)  PACLitaxel (TAXOL) 45 mg/m2 = 84 mg in sodium chloride 0.9% 250 mL chemo infusion, 45 mg/m2 = 84 mg, Intravenous, Clinic/HOD 1 time, 2 of 6 cycles         Oncologic History:   This is a 83 y.o. male patient with a history of There were no encounter diagnoses., who is referred to Hematology Oncology for newly diagnosed non-small cell lung cancer.  The patient's was incidentally found to have a left lung mass on a CT scan prior to planned hernia surgery.  Upon further questioning, the patient stated that he has left lower chest pain for several months which is not severe.  He also lost about 25 lb in the past several years.  The patient denies coughing short of breath.  He has no headache.     The patient underwent bronchoscopy with biopsy which shows non-small-cell lung cancer and no further classification was given.  A PET scan was ordered which shows a large hypermetabolic next chronic lesion in the  left lower lobe measuring 10 cm and encasing the descending aorta and invading the healing a.m. and mediastinum.  There was also 1 cm hypermetabolic lesion in the right neck close to the thyroid.  MRI of the brain demonstrated no evidence of intracranial metastatic disease unfortunately he has the asymptomatic infarct.     The patient is ex-smoker and decreased smoking 1985.  --XRT/Car/Taxol 10/25/2019                  Oncologic History     Oncologic Treatment     Pathology      Past Medical History:   Past Medical History:   Diagnosis Date    Arthritis     Cataract     removed 2011    GERD (gastroesophageal reflux disease)     Heart murmur     Lung cancer 09/2019    Neuromuscular disorder     neuropathy in feet    Prostate cancer 2007    Radiation seeds implanted    Stroke     patient unaware when this happened. Showed on MRI       Past Surgical HIstory:   Past Surgical History:   Procedure Laterality Date    CATARACT EXTRACTION, BILATERAL  2011    HERNIA REPAIR  2016       Allergies:  Review of patient's allergies indicates:  No Known Allergies    Medications:  Current Outpatient Medications   Medication Sig Dispense Refill    fentaNYL (DURAGESIC) 25 mcg/hr UNW AND JESSICA 1 PA TO SKIN Q 72 H  0    gabapentin (NEURONTIN) 100 MG capsule TK TWO CS PO  TID  0    HYDROcodone-acetaminophen (NORCO)  mg per tablet TK 1 T PO  Q 6 H PRN P  0    ondansetron (ZOFRAN) 8 MG tablet Take 1 tablet (8 mg total) by mouth every 8 (eight) hours as needed for Nausea. 30 tablet 2    oxyCODONE-acetaminophen (PERCOCET) 5-325 mg per tablet TK 1 TO 2 TS PO Q 6 H PRN P  0    tamsulosin (FLOMAX) 0.4 mg Cap TK 1 C PO QD  2    traMADol (ULTRAM) 50 mg tablet Take 50 mg by mouth every 8 (eight) hours as needed for Pain.       No current facility-administered medications for this visit.        Family History:   Family History   Problem Relation Age of Onset    Colon cancer Mother     Kidney cancer Father     Bladder  "Cancer Father     Lung cancer Sister     Lymphoma Brother     No Known Problems Maternal Aunt     No Known Problems Maternal Uncle     No Known Problems Paternal Aunt     No Known Problems Paternal Uncle     No Known Problems Maternal Grandmother     No Known Problems Maternal Grandfather     No Known Problems Paternal Grandmother     No Known Problems Paternal Grandfather     Cancer Brother        Social History:  reports that he quit smoking about 34 years ago. His smoking use included cigarettes. He started smoking about 64 years ago. He has a 30.00 pack-year smoking history. He has never used smokeless tobacco. He reports that he does not drink alcohol or use drugs.    Review of Systemas  Constitutional: No change in weight, appetie, fatigue, fever, or night sweats  Eyes: No changes in vision  Ears, Nose, Mouth, Throat, and Face: No hearing problems, ear pain, rummy nose, or sore throat  Respiratory: No shortness of breath or cough  Cardiovascular: No chest pain, palpitations or dizziness  Gastrointestinal: No abdominal pain, hematochezia, melena  Genitourinary: No dysuria, hematuria, nocturia, menstrual problems, post menopausal  Integumentary/Breast: No rashes or itching  Hematologic/Lymphatic: No anemia or bleeding abnormalities  Musculoskeletal: No joint or muscle abnormalities  Neurological: No sensory or motor problems, no headaches  Behavioral/Psych: No depression, anxiety, cognitive problems, or stress  Endocrine: No diabetes or thyroid problems  Allergic/Immunologic: See allergy above    Physical Exam      Vitals:   Vitals:    11/06/19 0916   BP: 128/62   BP Location: Left arm   Patient Position: Sitting   BP Method: Large (Automatic)   Pulse: (!) 56   Resp: 16   Temp: 98 °F (36.7 °C)   TempSrc: Temporal   SpO2: 96%   Weight: 70.5 kg (155 lb 6.4 oz)   Height: 5' 9" (1.753 m)     BMI: Body mass index is 22.95 kg/m².  BSA Body surface area is 1.85 meters squared.  ECOG Performance Status: "   ECOG SCORE    0 - Fully active-able to carry on all pre-disease performance without restriction       GENERAL APPEARANCE: Well developed, well nourished, in no acute distress.  SKIN: Inspection of the skin reveals no rashes, ulcerations or petechiae.  HEENT: The sclerae were anicteric and conjunctivae were pink and moist. Extraocular movements were intact and pupils were equal, round, and reactive to light with normal accommodation. External inspection of the ears and nose showed no scars, lesions, or masses. Lips, teeth, and gums showed normal mucosa. The oral mucosa, hard and soft palate, tongue and posterior pharynx were normal.  NECK: Supple and symmetric. There was no thyroid enlargement, and no tenderness, or masses were felt.  CRESPIRATORY: Normal AP diameter and normal contour without any kyphoscoliosis. LUNGS: Auscultation of the lungs revealed normal breath sounds without any other adventitious sounds or rubs.  CARDIOVASCULAR: There was a regular rate and rhythm without any murmurs, gallops, rubs. The carotid pulses were normal and 2+ bilaterally without bruits. Peripheral pulses were 2+ and symmetric.  GASTROINTESTINAL: Soft and nontender with normal bowel sounds. The liver span was approximately 5-6 cm in the right midclavicular line by percussion. The liver edge was nontender. The spleen was not palpable. There were no inguinal or umbilical hernias noted. No ascites was noted.  LYMPH NODES: No lymphadenopathy was appreciated in the neck, axillae or groin.  MUSCULOSKELETAL: Gait was normal. There was no tenderness or effusions noted. Muscle strength and tone were normal. EXTREMITIES: No cyanosis, clubbing or edema.  NEUROLOGIC: Alert and oriented x 3. Normal affect. Gait was normal. Normal deep tendon reflexes with no pathological reflexes. Sensation to touch was normal.  PSYCHIATRIC: good mood, orientated to place, time and person     Labs and Imagings     No visits with results within 12 Month(s)  from this visit.   Latest known visit with results is:   No results found for any previous visit.       Imaging:     Assessment:     Principle Problem: Malignant neoplasm of upper lobe of right lung [C34.11]   Co-morbidity/Active Problems:   Patient Active Problem List   Diagnosis    Malignant neoplasm of upper lobe of right lung        Gerry Powell is a 84 y.o. 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; there was no enough specimen to stain PDL1     There was no distant metastatic disease   the biopsy of  Thyroid  shows papillary carcinoma  ECOG 0-1  Dehydration   Anorexia     Plan:   Overall Plan:  Chemoradiation therapy followed by adjuvant immunotherapy     To Do: suggest dura 50 ug( prescribed by Dr Rodriguez), OK with 2L NS today, will come back 11/6 reassess, may use ABH compounding ofr v/n; encourage Ensurance     --Labs: CBC CMP every week during chemotherapy     --Proceed with Rx carboplatin and Taxol this  Wednesday weekly (started  10/23)  --Return to Clinic with appointment in 1 week     Signature    William Arguello M.D., Ph.D., [facility]  Hematology-Oncology    Signed 11/6/2019 9:44 AM   Note Ends Here

## 2019-11-20 ENCOUNTER — TELEPHONE (OUTPATIENT)
Dept: HEMATOLOGY/ONCOLOGY | Facility: CLINIC | Age: 84
End: 2019-11-20

## 2019-11-26 ENCOUNTER — TELEPHONE (OUTPATIENT)
Dept: HEMATOLOGY/ONCOLOGY | Facility: CLINIC | Age: 84
End: 2019-11-26

## 2019-11-26 NOTE — TELEPHONE ENCOUNTER
Spoke with daughter about rescheduling Mr. Powell's appointment. I did inform her that infusion could not accommodate him for chemo tomorrow as per request. I spoke with Jumana at infusion the earliest they can get the pt r/s for chemo is Thursday, December 5th. I explained this to the daughter. She was agreeable but concerned bc this was already coordinated with his radiation tx with Dr. Rodriguez. I will call Dr. Rodriguez's office and rearrange his radiation schedule if possible. Daughter is agreeable to plan. [facility]    LAURA Dial RN    ----- Message from [employer] sent at 11/26/2019 11:13 AM CST -----  Call jerri mcmullen patients daughter ..she has several questions as to why he cant do treatment.. [phone]

## 2019-12-05 ENCOUNTER — OFFICE VISIT (OUTPATIENT)
Dept: HEMATOLOGY/ONCOLOGY | Facility: CLINIC | Age: 84
End: 2019-12-05
Payer: MEDICARE

## 2019-12-05 VITALS
HEART RATE: 61 BPM | WEIGHT: 140 LBS | RESPIRATION RATE: 14 BRPM | BODY MASS INDEX: 20.73 KG/M2 | SYSTOLIC BLOOD PRESSURE: 111 MMHG | TEMPERATURE: 98 F | OXYGEN SATURATION: 96 % | DIASTOLIC BLOOD PRESSURE: 60 MMHG | HEIGHT: 69 IN

## 2019-12-05 DIAGNOSIS — C34.11 MALIGNANT NEOPLASM OF UPPER LOBE OF RIGHT LUNG: Primary | ICD-10-CM

## 2019-12-05 PROCEDURE — 1125F PR PAIN SEVERITY QUANTIFIED, PAIN PRESENT: ICD-10-PCS | Mod: S$GLB,,, | Performed by: INTERNAL MEDICINE

## 2019-12-05 PROCEDURE — 1159F MED LIST DOCD IN RCRD: CPT | Mod: S$GLB,,, | Performed by: INTERNAL MEDICINE

## 2019-12-05 PROCEDURE — 99215 PR OFFICE/OUTPT VISIT, EST, LEVL V, 40-54 MIN: ICD-10-PCS | Mod: S$GLB,,, | Performed by: INTERNAL MEDICINE

## 2019-12-05 PROCEDURE — 1125F AMNT PAIN NOTED PAIN PRSNT: CPT | Mod: S$GLB,,, | Performed by: INTERNAL MEDICINE

## 2019-12-05 PROCEDURE — 99215 OFFICE O/P EST HI 40 MIN: CPT | Mod: S$GLB,,, | Performed by: INTERNAL MEDICINE

## 2019-12-05 PROCEDURE — 1159F PR MEDICATION LIST DOCUMENTED IN MEDICAL RECORD: ICD-10-PCS | Mod: S$GLB,,, | Performed by: INTERNAL MEDICINE

## 2019-12-05 RX ORDER — HYDROMORPHONE HYDROCHLORIDE 4 MG/1
TABLET ORAL
Refills: 0 | COMMUNITY
Start: 2019-11-14

## 2019-12-05 RX ORDER — DIPHENHYDRAMINE HYDROCHLORIDE 50 MG/ML
50 INJECTION INTRAMUSCULAR; INTRAVENOUS ONCE AS NEEDED
Status: CANCELLED | OUTPATIENT
Start: 2019-12-09

## 2019-12-05 RX ORDER — FAMOTIDINE 10 MG/ML
20 INJECTION INTRAVENOUS
Status: CANCELLED | OUTPATIENT
Start: 2019-12-09

## 2019-12-05 RX ORDER — SODIUM CHLORIDE 0.9 % (FLUSH) 0.9 %
10 SYRINGE (ML) INJECTION
Status: CANCELLED | OUTPATIENT
Start: 2019-12-09

## 2019-12-05 RX ORDER — HEPARIN 100 UNIT/ML
500 SYRINGE INTRAVENOUS
Status: CANCELLED | OUTPATIENT
Start: 2019-12-09

## 2019-12-05 RX ORDER — LORAZEPAM 0.5 MG/1
TABLET ORAL
Refills: 1 | COMMUNITY
Start: 2019-11-18

## 2019-12-05 RX ORDER — EPINEPHRINE 0.3 MG/.3ML
0.3 INJECTION SUBCUTANEOUS ONCE AS NEEDED
Status: CANCELLED | OUTPATIENT
Start: 2019-12-09

## 2019-12-05 RX ORDER — METOPROLOL TARTRATE 50 MG/1
TABLET ORAL
Refills: 0 | COMMUNITY
Start: 2019-11-13 | End: 2019-12-27

## 2019-12-05 NOTE — PROGRESS NOTES
Hematology Oncology Progress Note    Hematology Oncology  [facility]     PATIENT: Gerry Powell  : 1935 84 y.o. male  MRN [id number]     PCP: Jayce Guerra MD  Consult Requested By:  No att. providers found    Date of Service: 2019    Subjective:   Interim History:  Lung Cancer (upper lobe of right lung )    Gerry Powell is here for to followup the patient is on chemotherapy radiation therapy and this is that the last 2 weeks of radiation therapy.  The patient is is received 3 cycles of carbo Taxol and aim is 1 cycle because of nausea and vomiting otherwise doing better now  Oncology History:     Malignant neoplasm of upper lobe of right lung    10/16/2019 Initial Diagnosis     Malignant neoplasm of upper lobe of right lung      10/23/2019 -  Chemotherapy     Treatment Summary   Plan Name: OP NSCLC PACLITAXEL + CARBOPLATIN (AUC) QW + RADIATION  Treatment Goal: Curative  Status: Active  Start Date: 10/23/2019  End Date: 2019 (Planned)  Provider: Jeny Ordoñez NP  Chemotherapy: CARBOplatin (PARAPLATIN) in sodium chloride 0.9% 250 mL chemo infusion,  (original dose 166.2 mg), Intravenous, Clinic/HOD 1 time, 3 of 6 cycles  Dose modification:   (original dose 166.2 mg, Cycle 1, Reason: MD Discretion)  PACLitaxel (TAXOL) 45 mg/m2 = 84 mg in sodium chloride 0.9% 250 mL chemo infusion, 45 mg/m2 = 84 mg, Intravenous, Clinic/HOD 1 time, 3 of 6 cycles         Oncologic History:   This is a 83 y.o. male patient with a history of There were no encounter diagnoses., who is referred to Hematology Oncology for newly diagnosed non-small cell lung cancer.  The patient's was incidentally found to have a left lung mass on a CT scan prior to planned hernia surgery.  Upon further questioning, the patient stated that he has left lower chest pain for several months which is not severe.  He also lost about 25 lb in the past several years.  The patient denies coughing short of breath.  He has no  headache.     The patient underwent bronchoscopy with biopsy which shows non-small-cell lung cancer and no further classification was given.  A PET scan was ordered which shows a large hypermetabolic next chronic lesion in the left lower lobe measuring 10 cm and encasing the descending aorta and invading the healing a.m. and mediastinum.  There was also 1 cm hypermetabolic lesion in the right neck close to the thyroid.  MRI of the brain demonstrated no evidence of intracranial metastatic disease unfortunately he has the asymptomatic infarct.     The patient is ex-smoker and decreased smoking 1985.  --XRT/Car/Taxol 10/25/2019                  Oncologic History     Oncologic Treatment     Pathology      Past Medical History:   Past Medical History:   Diagnosis Date    Arthritis     Cataract     removed 2011    GERD (gastroesophageal reflux disease)     Heart murmur     Lung cancer 09/2019    Neuromuscular disorder     neuropathy in feet    Prostate cancer 2007    Radiation seeds implanted    Stroke     patient unaware when this happened. Showed on MRI       Past Surgical HIstory:   Past Surgical History:   Procedure Laterality Date    CATARACT EXTRACTION, BILATERAL  2011    HERNIA REPAIR  2016       Allergies:  Review of patient's allergies indicates:  No Known Allergies    Medications:  Current Outpatient Medications   Medication Sig Dispense Refill    HYDROmorphone (DILAUDID) 4 MG tablet TK 1 T PO Q 4 H PRN  0    LORazepam (ATIVAN) 0.5 MG tablet TK 1 T PO Q 6 H PRN  1    metoprolol tartrate (LOPRESSOR) 50 MG tablet TK 1/2 T PO BID  0    ondansetron (ZOFRAN) 8 MG tablet Take 1 tablet (8 mg total) by mouth every 8 (eight) hours as needed for Nausea. 30 tablet 2    tamsulosin (FLOMAX) 0.4 mg Cap TK 1 C PO QD  2    gabapentin (NEURONTIN) 100 MG capsule TK TWO CS PO  TID  0    HYDROcodone-acetaminophen (NORCO)  mg per tablet TK 1 T PO  Q 6 H PRN P  0     No current facility-administered  medications for this visit.        Family History:   Family History   Problem Relation Age of Onset    Colon cancer Mother     Kidney cancer Father     Bladder Cancer Father     Lung cancer Sister     Lymphoma Brother     No Known Problems Maternal Aunt     No Known Problems Maternal Uncle     No Known Problems Paternal Aunt     No Known Problems Paternal Uncle     No Known Problems Maternal Grandmother     No Known Problems Maternal Grandfather     No Known Problems Paternal Grandmother     No Known Problems Paternal Grandfather     Cancer Brother        Social History:  reports that he quit smoking about 34 years ago. His smoking use included cigarettes. He started smoking about 64 years ago. He has a 30.00 pack-year smoking history. He has never used smokeless tobacco. He reports that he does not drink alcohol or use drugs.    Review of Systemas  Constitutional: No change in weight, appetie, fatigue, fever, or night sweats  Eyes: No changes in vision  Ears, Nose, Mouth, Throat, and Face: No hearing problems, ear pain, rummy nose, or sore throat  Respiratory: No shortness of breath or cough  Cardiovascular: No chest pain, palpitations or dizziness  Gastrointestinal: No abdominal pain, hematochezia, melena  Genitourinary: No dysuria, hematuria, nocturia, menstrual problems, post menopausal  Integumentary/Breast: No rashes or itching  Hematologic/Lymphatic: No anemia or bleeding abnormalities  Musculoskeletal: No joint or muscle abnormalities  Neurological: No sensory or motor problems, no headaches  Behavioral/Psych: No depression, anxiety, cognitive problems, or stress  Endocrine: No diabetes or thyroid problems  Allergic/Immunologic: See allergy above    Physical Exam      Vitals:   Vitals:    12/05/19 0927   BP: 111/60   BP Location: Right arm   Patient Position: Sitting   BP Method: Large (Automatic)   Pulse: 61   Resp: 14   Temp: 97.6 °F (36.4 °C)   TempSrc: Temporal   SpO2: 96%   Weight: 63.5 kg  "(140 lb)   Height: 5' 9" (1.753 m)     BMI: Body mass index is 20.67 kg/m².  BSA Body surface area is 1.76 meters squared.  ECOG Performance Status:   ECOG SCORE    0 - Fully active-able to carry on all pre-disease performance without restriction       GENERAL APPEARANCE: Well developed, well nourished, in no acute distress.  SKIN: Inspection of the skin reveals no rashes, ulcerations or petechiae.  HEENT: The sclerae were anicteric and conjunctivae were pink and moist. Extraocular movements were intact and pupils were equal, round, and reactive to light with normal accommodation. External inspection of the ears and nose showed no scars, lesions, or masses. Lips, teeth, and gums showed normal mucosa. The oral mucosa, hard and soft palate, tongue and posterior pharynx were normal.  NECK: Supple and symmetric. There was no thyroid enlargement, and no tenderness, or masses were felt.  CRESPIRATORY: Normal AP diameter and normal contour without any kyphoscoliosis. LUNGS: Auscultation of the lungs revealed normal breath sounds without any other adventitious sounds or rubs.  CARDIOVASCULAR: There was a regular rate and rhythm without any murmurs, gallops, rubs. The carotid pulses were normal and 2+ bilaterally without bruits. Peripheral pulses were 2+ and symmetric.  GASTROINTESTINAL: Soft and nontender with normal bowel sounds. The liver span was approximately 5-6 cm in the right midclavicular line by percussion. The liver edge was nontender. The spleen was not palpable. There were no inguinal or umbilical hernias noted. No ascites was noted.  LYMPH NODES: No lymphadenopathy was appreciated in the neck, axillae or groin.  MUSCULOSKELETAL: Gait was normal. There was no tenderness or effusions noted. Muscle strength and tone were normal. EXTREMITIES: No cyanosis, clubbing or edema.  NEUROLOGIC: Alert and oriented x 3. Normal affect. Gait was normal. Normal deep tendon reflexes with no pathological reflexes. Sensation to " touch was normal.  PSYCHIATRIC: good mood, orientated to place, time and person     Labs and Imagings     No visits with results within 12 Month(s) from this visit.   Latest known visit with results is:   No results found for any previous visit.       Imaging:     Assessment:     Principle Problem: Malignant neoplasm of upper lobe of right lung [C34.11]   Co-morbidity/Active Problems:   Patient Active Problem List   Diagnosis    Malignant neoplasm of upper lobe of right lung        Gerry Poewll is a 84 y.o. 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, Ph.D., [facility]  Hematology-Oncology    Signed 12/5/2019 9:44 AM   Note Ends Here

## 2019-12-11 ENCOUNTER — TELEPHONE (OUTPATIENT)
Dept: HEMATOLOGY/ONCOLOGY | Facility: CLINIC | Age: 84
End: 2019-12-11

## 2019-12-11 NOTE — TELEPHONE ENCOUNTER
----- Message from Christine De Luna sent at 12/11/2019  1:53 PM CST -----  Contact: patients daughter Raquel  Called and states he is throwing up continously with medication.. hasnt had anything to eat today.. No fever..   [phone]... The medication he is taking is ZOPHRAN for nausea.

## 2019-12-11 NOTE — TELEPHONE ENCOUNTER
Returned daughter's call. She reported they had called the ambulance, and patient was on his way to the ER at [facility]. She reported he had not been able to eat or drink anything today. Reported he had taken 4 Zofran and 1 Ativan. Patient had rested for a little bit, but then woke up vomiting.

## 2019-12-16 NOTE — PLAN OF CARE
START ON PATHWAY REGIMEN - Non-Small Cell Lung    [id number]        Durvalumab (Imfinzi(R))           Additional Orders: Monitor for infusion reactions; interrupt or slow   infusion for grade 1 or 2 reaction occurs and consider premedication for   subsequent infusion; discontinue for grade 3 or 4 infusion reaction.   See   prescribing information for dose interruption guidelines and management of   toxicity.  Ref: Anisa et al. J Clin Oncol 34:7433-9822, 2016.    **Always confirm dose/schedule in your pharmacy ordering system**    Patient Characteristics:  Stage III - Unresectable, PS = 0, 1  AJCC T Category: T3  Current Disease Status: No Distant Mets or Local Recurrence  AJCC N Category: N2  AJCC M Category: M0  AJCC 8 Stage Grouping: IIIB  Performance Status: PS = 0, 1  Intent of Therapy:  Curative Intent, Discussed with Patient

## 2019-12-27 ENCOUNTER — OFFICE VISIT (OUTPATIENT)
Dept: HEMATOLOGY/ONCOLOGY | Facility: CLINIC | Age: 84
End: 2019-12-27
Payer: MEDICARE

## 2019-12-27 VITALS
HEIGHT: 69 IN | BODY MASS INDEX: 21.09 KG/M2 | DIASTOLIC BLOOD PRESSURE: 56 MMHG | SYSTOLIC BLOOD PRESSURE: 125 MMHG | OXYGEN SATURATION: 93 % | TEMPERATURE: 98 F | RESPIRATION RATE: 10 BRPM | WEIGHT: 142.38 LBS | HEART RATE: 49 BPM

## 2019-12-27 DIAGNOSIS — C34.11 MALIGNANT NEOPLASM OF UPPER LOBE OF RIGHT LUNG: Primary | ICD-10-CM

## 2019-12-27 PROCEDURE — 1125F PR PAIN SEVERITY QUANTIFIED, PAIN PRESENT: ICD-10-PCS | Mod: S$GLB,,, | Performed by: INTERNAL MEDICINE

## 2019-12-27 PROCEDURE — 1159F MED LIST DOCD IN RCRD: CPT | Mod: S$GLB,,, | Performed by: INTERNAL MEDICINE

## 2019-12-27 PROCEDURE — 99215 OFFICE O/P EST HI 40 MIN: CPT | Mod: S$GLB,,, | Performed by: INTERNAL MEDICINE

## 2019-12-27 PROCEDURE — 1159F PR MEDICATION LIST DOCUMENTED IN MEDICAL RECORD: ICD-10-PCS | Mod: S$GLB,,, | Performed by: INTERNAL MEDICINE

## 2019-12-27 PROCEDURE — 99215 PR OFFICE/OUTPT VISIT, EST, LEVL V, 40-54 MIN: ICD-10-PCS | Mod: S$GLB,,, | Performed by: INTERNAL MEDICINE

## 2019-12-27 PROCEDURE — 1125F AMNT PAIN NOTED PAIN PRSNT: CPT | Mod: S$GLB,,, | Performed by: INTERNAL MEDICINE

## 2019-12-27 RX ORDER — SODIUM CHLORIDE 0.9 % (FLUSH) 0.9 %
10 SYRINGE (ML) INJECTION
Status: CANCELLED | OUTPATIENT
Start: 2019-12-27

## 2019-12-27 RX ORDER — FENTANYL 25 UG/1
1 PATCH TRANSDERMAL
Qty: 5 PATCH | Refills: 0 | Status: SHIPPED | OUTPATIENT
Start: 2019-12-27 | End: 2020-12-26

## 2019-12-27 RX ORDER — FENTANYL 50 UG/1
1 PATCH TRANSDERMAL
COMMUNITY
End: 2019-12-27

## 2019-12-27 RX ORDER — HEPARIN 100 UNIT/ML
500 SYRINGE INTRAVENOUS
Status: CANCELLED | OUTPATIENT
Start: 2019-12-27

## 2019-12-27 NOTE — PROGRESS NOTES
Hematology Oncology Progress Note    Hematology Oncology  [facility]     PATIENT: Gerry Powell  : 1935 84 y.o. male  MRN [id number]     PCP: Jayce Guerra MD  Consult Requested By:  No att. providers found    Date of Service: 2019    Subjective:   Interim History:  Lung Cancer (upper lobe of right lung )    Gerry Powell is here for to followup.  the patient is  Status post chemotherapy radiation therapy.  He has nausea after using Duragesic patch.  He fell done yesterday without major injuries except closest to the left face.  His heart recent low 40s and Lopressor was discontinued.  Oncology History:     Malignant neoplasm of upper lobe of right lung    10/16/2019 Initial Diagnosis     Malignant neoplasm of upper lobe of right lung      10/23/2019 - 12/15/2019 Chemotherapy     Treatment Summary   Plan Name: OP NSCLC PACLITAXEL + CARBOPLATIN (AUC) QW + RADIATION  Treatment Goal: Curative  Status: Inactive  Start Date: 10/23/2019  End Date: 2019  Provider: Jeny Ordoñez NP  Chemotherapy: CARBOplatin (PARAPLATIN) in sodium chloride 0.9% 250 mL chemo infusion,  (original dose 166.2 mg), Intravenous, Clinic/HOD 1 time, 4 of 6 cycles  Dose modification:   (original dose 166.2 mg, Cycle 1, Reason: MD Discretion)  PACLitaxel (TAXOL) 45 mg/m2 = 84 mg in sodium chloride 0.9% 250 mL chemo infusion, 45 mg/m2 = 84 mg, Intravenous, Clinic/HOD 1 time, 4 of 6 cycles      2019 -  Chemotherapy     Treatment Summary   Plan Name: OP DURVALUMAB Q2W  Treatment Goal: Curative  Status: Active  Start Date: 2019 (Planned)  End Date: 2020 (Planned)  Provider: Jeny Ordoñez NP  Chemotherapy: durvalumab (IMFINZI) 635 mg in sodium chloride 0.9% 262.7 mL chemo infusion, 10 mg/kg = 635 mg, Intravenous, Clinic/HOD 1 time, 0 of 26 cycles         Oncologic History:   This is a 83 y.o. male patient with a history of There were no encounter diagnoses., who is referred to Hematology Oncology for newly  diagnosed non-small cell lung cancer.  The patient's was incidentally found to have a left lung mass on a CT scan prior to planned hernia surgery.  Upon further questioning, the patient stated that he has left lower chest pain for several months which is not severe.  He also lost about 25 lb in the past several years.  The patient denies coughing short of breath.  He has no headache.     The patient underwent bronchoscopy with biopsy which shows non-small-cell lung cancer and no further classification was given.  A PET scan was ordered which shows a large hypermetabolic next chronic lesion in the left lower lobe measuring 10 cm and encasing the descending aorta and invading the healing a.m. and mediastinum.  There was also 1 cm hypermetabolic lesion in the right neck close to the thyroid.  MRI of the brain demonstrated no evidence of intracranial metastatic disease unfortunately he has the asymptomatic infarct.     The patient is ex-smoker and decreased smoking 1985.  --XRT/Car/Taxol 10/25/2019                  Oncologic History     Oncologic Treatment     Pathology      Past Medical History:   Past Medical History:   Diagnosis Date    Arthritis     Cataract     removed 2011    GERD (gastroesophageal reflux disease)     Heart murmur     Lung cancer 09/2019    Neuromuscular disorder     neuropathy in feet    Prostate cancer 2007    Radiation seeds implanted    Stroke     patient unaware when this happened. Showed on MRI       Past Surgical HIstory:   Past Surgical History:   Procedure Laterality Date    CATARACT EXTRACTION, BILATERAL  2011    HERNIA REPAIR  2016       Allergies:  Review of patient's allergies indicates:  No Known Allergies    Medications:  Current Outpatient Medications   Medication Sig Dispense Refill    HYDROmorphone (DILAUDID) 4 MG tablet TK 1 T PO Q 4 H PRN  0    LORazepam (ATIVAN) 0.5 MG tablet TK 1 T PO Q 6 H PRN  1    ondansetron (ZOFRAN) 8 MG tablet Take 1 tablet (8 mg total)  by mouth every 8 (eight) hours as needed for Nausea. 30 tablet 2    fentaNYL (DURAGESIC) 25 mcg/hr Place 1 patch onto the skin every 72 hours. 5 patch 0    gabapentin (NEURONTIN) 100 MG capsule TK TWO CS PO  TID  0    tamsulosin (FLOMAX) 0.4 mg Cap TK 1 C PO QD  2     No current facility-administered medications for this visit.        Family History:   Family History   Problem Relation Age of Onset    Colon cancer Mother     Kidney cancer Father     Bladder Cancer Father     Lung cancer Sister     Lymphoma Brother     No Known Problems Maternal Aunt     No Known Problems Maternal Uncle     No Known Problems Paternal Aunt     No Known Problems Paternal Uncle     No Known Problems Maternal Grandmother     No Known Problems Maternal Grandfather     No Known Problems Paternal Grandmother     No Known Problems Paternal Grandfather     Cancer Brother        Social History:  reports that he quit smoking about 35 years ago. His smoking use included cigarettes. He started smoking about 65 years ago. He has a 30.00 pack-year smoking history. He has never used smokeless tobacco. He reports that he does not drink alcohol or use drugs.    Review of Systemas  Constitutional: No change in weight, appetie, fatigue, fever, or night sweats  Eyes: No changes in vision  Ears, Nose, Mouth, Throat, and Face: No hearing problems, ear pain, rummy nose, or sore throat  Respiratory: No shortness of breath or cough  Cardiovascular: No chest pain, palpitations or dizziness  Gastrointestinal: No abdominal pain, hematochezia, melena  Genitourinary: No dysuria, hematuria, nocturia, menstrual problems, post menopausal  Integumentary/Breast: No rashes or itching  Hematologic/Lymphatic: No anemia or bleeding abnormalities  Musculoskeletal: No joint or muscle abnormalities  Neurological: No sensory or motor problems, no headaches  Behavioral/Psych: No depression, anxiety, cognitive problems, or stress  Endocrine: No diabetes or  "thyroid problems  Allergic/Immunologic: See allergy above    Physical Exam      Vitals:   Vitals:    12/27/19 0953   BP: (!) 125/56   BP Location: Right arm   Patient Position: Sitting   BP Method: Large (Automatic)   Pulse: (!) 49   Resp: 10   Temp: 97.9 °F (36.6 °C)   TempSrc: Temporal   SpO2: (!) 93%   Weight: 64.6 kg (142 lb 6.4 oz)   Height: 5' 9" (1.753 m)     BMI: Body mass index is 21.03 kg/m².  BSA Body surface area is 1.77 meters squared.  ECOG Performance Status:   ECOG SCORE    1 - Restricted in strenuous activity-ambulatory and able to carry out work of a light nature       GENERAL APPEARANCE: Well developed, well nourished, in no acute distress.  SKIN: Inspection of the skin reveals no rashes, ulcerations or petechiae.  HEENT: The sclerae were anicteric and conjunctivae were pink and moist. Extraocular movements were intact and pupils were equal, round, and reactive to light with normal accommodation. External inspection of the ears and nose showed no scars, lesions, or masses. Lips, teeth, and gums showed normal mucosa. The oral mucosa, hard and soft palate, tongue and posterior pharynx were normal.  NECK: Supple and symmetric. There was no thyroid enlargement, and no tenderness, or masses were felt.  CRESPIRATORY: Normal AP diameter and normal contour without any kyphoscoliosis. LUNGS: Auscultation of the lungs revealed normal breath sounds without any other adventitious sounds or rubs.  CARDIOVASCULAR: There was a regular rate and rhythm without any murmurs, gallops, rubs. The carotid pulses were normal and 2+ bilaterally without bruits. Peripheral pulses were 2+ and symmetric.  GASTROINTESTINAL: Soft and nontender with normal bowel sounds. The liver span was approximately 5-6 cm in the right midclavicular line by percussion. The liver edge was nontender. The spleen was not palpable. There were no inguinal or umbilical hernias noted. No ascites was noted.  LYMPH NODES: No lymphadenopathy was " appreciated in the neck, axillae or groin.  MUSCULOSKELETAL: Gait was normal. There was no tenderness or effusions noted. Muscle strength and tone were normal. EXTREMITIES: No cyanosis, clubbing or edema.  NEUROLOGIC: Alert and oriented x 3. Normal affect. Gait was normal. Normal deep tendon reflexes with no pathological reflexes. Sensation to touch was normal.  PSYCHIATRIC: good mood, orientated to place, time and person     Labs and Imagings     No visits with results within 12 Month(s) from this visit.   Latest known visit with results is:   No results found for any previous visit.       Imaging:     Assessment:     Principle Problem: Malignant neoplasm of upper lobe of right lung [C34.11]   Co-morbidity/Active Problems:   Patient Active Problem List   Diagnosis    Malignant neoplasm of upper lobe of right lung        Gerry Powell is a 84 y.o. 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; there was no enough specimen to stain PDL1     There was no distant metastatic disease   the biopsy of  Thyroid  shows papillary carcinoma  ECOG 0-1  Dehydration improved  Anorexia improved  Mild leukopenia     Plan:   Overall Plan:  Chemoradiation therapy followed by adjuvant immunotherapy  Durvalumab Imfinzi® every 2 weeks for 12 months       ==  Start Durvalumab   today  == Labs: CBC CMP  every 2 weeks  == reduced due to his past from 50-25 every 3 days;  Used Dilaudid 4 mg p.r.n.;  Discontinue Lopressor due to bradycardia  ==Return to Clinic with appointment in 2 weeks    Signature    William Arguello M.D., Ph.D., [facility]  Hematology-Oncology    Signed 12/27/2019 9:44 AM   Note Ends Here

## 2019-12-30 ENCOUNTER — OFFICE VISIT (OUTPATIENT)
Dept: HEMATOLOGY/ONCOLOGY | Facility: CLINIC | Age: 84
End: 2019-12-30
Payer: MEDICARE

## 2019-12-30 VITALS
RESPIRATION RATE: 18 BRPM | DIASTOLIC BLOOD PRESSURE: 55 MMHG | TEMPERATURE: 100 F | HEART RATE: 77 BPM | BODY MASS INDEX: 21.06 KG/M2 | SYSTOLIC BLOOD PRESSURE: 130 MMHG | OXYGEN SATURATION: 94 % | WEIGHT: 142.63 LBS

## 2019-12-30 DIAGNOSIS — C34.11 MALIGNANT NEOPLASM OF UPPER LOBE OF RIGHT LUNG: Primary | ICD-10-CM

## 2019-12-30 PROCEDURE — 99214 PR OFFICE/OUTPT VISIT, EST, LEVL IV, 30-39 MIN: ICD-10-PCS | Mod: S$GLB,,, | Performed by: INTERNAL MEDICINE

## 2019-12-30 PROCEDURE — 1159F PR MEDICATION LIST DOCUMENTED IN MEDICAL RECORD: ICD-10-PCS | Mod: S$GLB,,, | Performed by: INTERNAL MEDICINE

## 2019-12-30 PROCEDURE — 1159F MED LIST DOCD IN RCRD: CPT | Mod: S$GLB,,, | Performed by: INTERNAL MEDICINE

## 2019-12-30 PROCEDURE — 99214 OFFICE O/P EST MOD 30 MIN: CPT | Mod: S$GLB,,, | Performed by: INTERNAL MEDICINE

## 2019-12-30 NOTE — PROGRESS NOTES
Hematology Oncology Progress Note    Hematology Oncology  [facility]     PATIENT: Gerry Powell  : 1935 84 y.o. male  MRN [id number]     PCP: Jayce Guerra MD  Consult Requested By:  No att. providers found    Date of Service: 2019    Subjective:   Interim History:  Fever    Gerry Powell is here for to followup add on.  Wife took a temperature to this morning and it was 101.  The patient feel cold but no chills  coughing diarrhea or skin rash  Oncology History:     Malignant neoplasm of upper lobe of right lung    10/16/2019 Initial Diagnosis     Malignant neoplasm of upper lobe of right lung      10/23/2019 - 12/15/2019 Chemotherapy     Treatment Summary   Plan Name: OP NSCLC PACLITAXEL + CARBOPLATIN (AUC) QW + RADIATION  Treatment Goal: Curative  Status: Inactive  Start Date: 10/23/2019  End Date: 2019  Provider: Jeny Ordoñez NP  Chemotherapy: CARBOplatin (PARAPLATIN) in sodium chloride 0.9% 250 mL chemo infusion,  (original dose 166.2 mg), Intravenous, Clinic/HOD 1 time, 4 of 6 cycles  Dose modification:   (original dose 166.2 mg, Cycle 1, Reason: MD Discretion)  PACLitaxel (TAXOL) 45 mg/m2 = 84 mg in sodium chloride 0.9% 250 mL chemo infusion, 45 mg/m2 = 84 mg, Intravenous, Clinic/HOD 1 time, 4 of 6 cycles      2019 -  Chemotherapy     Treatment Summary   Plan Name: OP DURVALUMAB Q2W  Treatment Goal: Curative  Status: Active  Start Date: 2019 (Planned)  End Date: 2020 (Planned)  Provider: Jeny Ordoñez NP  Chemotherapy: durvalumab (IMFINZI) 635 mg in sodium chloride 0.9% 262.7 mL chemo infusion, 10 mg/kg = 635 mg, Intravenous, Clinic/HOD 1 time, 0 of 26 cycles         Oncologic History:   This is a 83 y.o. male patient with a history of There were no encounter diagnoses., who is referred to Hematology Oncology for newly diagnosed non-small cell lung cancer.  The patient's was incidentally found to have a left lung mass on a CT scan prior to planned hernia surgery.   Upon further questioning, the patient stated that he has left lower chest pain for several months which is not severe.  He also lost about 25 lb in the past several years.  The patient denies coughing short of breath.  He has no headache.     The patient underwent bronchoscopy with biopsy which shows non-small-cell lung cancer and no further classification was given.  A PET scan was ordered which shows a large hypermetabolic next chronic lesion in the left lower lobe measuring 10 cm and encasing the descending aorta and invading the healing a.m. and mediastinum.  There was also 1 cm hypermetabolic lesion in the right neck close to the thyroid.  MRI of the brain demonstrated no evidence of intracranial metastatic disease unfortunately he has the asymptomatic infarct.     The patient is ex-smoker and decreased smoking 1985.  --XRT/Car/Taxol 10/25/2019                  Oncologic History     Oncologic Treatment     Pathology      Past Medical History:   Past Medical History:   Diagnosis Date    Arthritis     Cancer     Cataract     removed 2011    GERD (gastroesophageal reflux disease)     Heart murmur     Lung cancer 09/2019    Neuromuscular disorder     neuropathy in feet    Prostate cancer 2007    Radiation seeds implanted    Stroke     patient unaware when this happened. Showed on MRI       Past Surgical HIstory:   Past Surgical History:   Procedure Laterality Date    CATARACT EXTRACTION, BILATERAL  2011    HERNIA REPAIR  2016       Allergies:  Review of patient's allergies indicates:  No Known Allergies    Medications:  Current Outpatient Medications   Medication Sig Dispense Refill    fentaNYL (DURAGESIC) 25 mcg/hr Place 1 patch onto the skin every 72 hours. 5 patch 0    gabapentin (NEURONTIN) 100 MG capsule TK TWO CS PO  TID  0    HYDROmorphone (DILAUDID) 4 MG tablet TK 1 T PO Q 4 H PRN  0    LORazepam (ATIVAN) 0.5 MG tablet TK 1 T PO Q 6 H PRN  1    ondansetron (ZOFRAN) 8 MG tablet Take 1  tablet (8 mg total) by mouth every 8 (eight) hours as needed for Nausea. 30 tablet 2    tamsulosin (FLOMAX) 0.4 mg Cap TK 1 C PO QD  2     No current facility-administered medications for this visit.        Family History:   Family History   Problem Relation Age of Onset    Colon cancer Mother     Kidney cancer Father     Bladder Cancer Father     Lung cancer Sister     Lymphoma Brother     No Known Problems Maternal Aunt     No Known Problems Maternal Uncle     No Known Problems Paternal Aunt     No Known Problems Paternal Uncle     No Known Problems Maternal Grandmother     No Known Problems Maternal Grandfather     No Known Problems Paternal Grandmother     No Known Problems Paternal Grandfather     Cancer Brother        Social History:  reports that he quit smoking about 35 years ago. His smoking use included cigarettes. He started smoking about 65 years ago. He has a 30.00 pack-year smoking history. He has never used smokeless tobacco. He reports that he does not drink alcohol or use drugs.    Review of Systemas  Constitutional: No change in weight, appetie, fatigue, fever, or night sweats  Eyes: No changes in vision  Ears, Nose, Mouth, Throat, and Face: No hearing problems, ear pain, rummy nose, or sore throat  Respiratory: No shortness of breath or cough  Cardiovascular: No chest pain, palpitations or dizziness  Gastrointestinal: No abdominal pain, hematochezia, melena  Genitourinary: No dysuria, hematuria, nocturia, menstrual problems, post menopausal  Integumentary/Breast: No rashes or itching  Hematologic/Lymphatic: No anemia or bleeding abnormalities  Musculoskeletal: No joint or muscle abnormalities  Neurological: No sensory or motor problems, no headaches  Behavioral/Psych: No depression, anxiety, cognitive problems, or stress  Endocrine: No diabetes or thyroid problems  Allergic/Immunologic: See allergy above    Physical Exam      Vitals:   Vitals:    12/30/19 1125   BP: (!) 130/55   BP  Location: Left arm   Patient Position: Sitting   BP Method: Large (Automatic)   Pulse: 77   Resp: 18   Temp: 99.9 °F (37.7 °C)   TempSrc: Temporal   SpO2: (!) 94%   Weight: 64.7 kg (142 lb 9.6 oz)     BMI: Body mass index is 21.06 kg/m².  BSA Body surface area is 1.77 meters squared.  ECOG Performance Status:   ECOG SCORE    1 - Restricted in strenuous activity-ambulatory and able to carry out work of a light nature       GENERAL APPEARANCE: Well developed, well nourished, in no acute distress.  SKIN: Inspection of the skin reveals no rashes, ulcerations or petechiae.  HEENT: The sclerae were anicteric and conjunctivae were pink and moist. Extraocular movements were intact and pupils were equal, round, and reactive to light with normal accommodation. External inspection of the ears and nose showed no scars, lesions, or masses. Lips, teeth, and gums showed normal mucosa. The oral mucosa, hard and soft palate, tongue and posterior pharynx were normal.  NECK: Supple and symmetric. There was no thyroid enlargement, and no tenderness, or masses were felt.  CRESPIRATORY: Normal AP diameter and normal contour without any kyphoscoliosis. LUNGS: Auscultation of the lungs revealed normal breath sounds without any other adventitious sounds or rubs.  CARDIOVASCULAR: There was a regular rate and rhythm without any murmurs, gallops, rubs. The carotid pulses were normal and 2+ bilaterally without bruits. Peripheral pulses were 2+ and symmetric.  GASTROINTESTINAL: Soft and nontender with normal bowel sounds. The liver span was approximately 5-6 cm in the right midclavicular line by percussion. The liver edge was nontender. The spleen was not palpable. There were no inguinal or umbilical hernias noted. No ascites was noted.  LYMPH NODES: No lymphadenopathy was appreciated in the neck, axillae or groin.  MUSCULOSKELETAL: Gait was normal. There was no tenderness or effusions noted. Muscle strength and tone were normal. EXTREMITIES: No  cyanosis, clubbing or edema.  NEUROLOGIC: Alert and oriented x 3. Normal affect. Gait was normal. Normal deep tendon reflexes with no pathological reflexes. Sensation to touch was normal.  PSYCHIATRIC: good mood, orientated to place, time and person     Labs and Imagings     No visits with results within 12 Month(s) from this visit.   Latest known visit with results is:   No results found for any previous visit.       Imaging:     Assessment:     Principle Problem: Malignant neoplasm of upper lobe of right lung [C34.11]   Co-morbidity/Active Problems:   Patient Active Problem List   Diagnosis    Malignant neoplasm of upper lobe of right lung        Gerry Powell is a 84 y.o. 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; there was no enough specimen to stain PDL1     There was no distant metastatic disease   the biopsy of  Thyroid  shows papillary carcinoma  ECOG 0-1  Dehydration improved  Anorexia improved  Mild leukopenia     Plan:   Overall Plan:  Chemoradiation therapy followed by adjuvant immunotherapy  Durvalumab Imfinzi® every 2 weeks for 12 months     Low-grade fever continue observation  == Durvalumab next cycle due  January 10  == Labs: CBC CMP  every 2 weeks  == reduced duragesic t from 50-25 every 3 days;  Used Dilaudid 4 mg p.r.n.;  Discontinue Lopressor due to bradycardia  ==Return to Clinic with appointment in January 10th    Greg Arguello M.D., Ph.D., [facility]  Hematology-Oncology    Signed 12/30/2019 9:44 AM   Note Ends Here

## 2020-01-07 ENCOUNTER — TELEPHONE (OUTPATIENT)
Dept: HEMATOLOGY/ONCOLOGY | Facility: CLINIC | Age: 85
End: 2020-01-07

## 2020-01-10 ENCOUNTER — OFFICE VISIT (OUTPATIENT)
Dept: HEMATOLOGY/ONCOLOGY | Facility: CLINIC | Age: 85
End: 2020-01-10
Payer: MEDICARE

## 2020-01-10 ENCOUNTER — TELEPHONE (OUTPATIENT)
Dept: HEMATOLOGY/ONCOLOGY | Facility: CLINIC | Age: 85
End: 2020-01-10

## 2020-01-10 VITALS
HEIGHT: 69 IN | SYSTOLIC BLOOD PRESSURE: 163 MMHG | TEMPERATURE: 98 F | DIASTOLIC BLOOD PRESSURE: 77 MMHG | WEIGHT: 137.38 LBS | BODY MASS INDEX: 20.35 KG/M2 | RESPIRATION RATE: 14 BRPM | HEART RATE: 80 BPM | OXYGEN SATURATION: 92 %

## 2020-01-10 DIAGNOSIS — Z29.89 IMMUNOTHERAPY ENCOUNTER: ICD-10-CM

## 2020-01-10 DIAGNOSIS — C34.11 MALIGNANT NEOPLASM OF UPPER LOBE OF RIGHT LUNG: ICD-10-CM

## 2020-01-10 DIAGNOSIS — C34.11 MALIGNANT NEOPLASM OF UPPER LOBE OF RIGHT LUNG: Primary | ICD-10-CM

## 2020-01-10 DIAGNOSIS — R23.8 SKIN BREAKDOWN: ICD-10-CM

## 2020-01-10 DIAGNOSIS — Z79.899 ENCOUNTER FOR MEDICATION MANAGEMENT: ICD-10-CM

## 2020-01-10 DIAGNOSIS — K59.1 FUNCTIONAL DIARRHEA: ICD-10-CM

## 2020-01-10 PROCEDURE — 99214 PR OFFICE/OUTPT VISIT, EST, LEVL IV, 30-39 MIN: ICD-10-PCS | Mod: S$GLB,,, | Performed by: INTERNAL MEDICINE

## 2020-01-10 PROCEDURE — 1126F AMNT PAIN NOTED NONE PRSNT: CPT | Mod: S$GLB,,, | Performed by: INTERNAL MEDICINE

## 2020-01-10 PROCEDURE — 1159F PR MEDICATION LIST DOCUMENTED IN MEDICAL RECORD: ICD-10-PCS | Mod: S$GLB,,, | Performed by: INTERNAL MEDICINE

## 2020-01-10 PROCEDURE — 1126F PR PAIN SEVERITY QUANTIFIED, NO PAIN PRESENT: ICD-10-PCS | Mod: S$GLB,,, | Performed by: INTERNAL MEDICINE

## 2020-01-10 PROCEDURE — 99214 OFFICE O/P EST MOD 30 MIN: CPT | Mod: S$GLB,,, | Performed by: INTERNAL MEDICINE

## 2020-01-10 PROCEDURE — 1159F MED LIST DOCD IN RCRD: CPT | Mod: S$GLB,,, | Performed by: INTERNAL MEDICINE

## 2020-01-10 RX ORDER — PREDNISONE 50 MG/1
50 TABLET ORAL DAILY
Qty: 30 TABLET | Refills: 0 | Status: SHIPPED | OUTPATIENT
Start: 2020-01-10 | End: 2021-01-09

## 2020-01-10 NOTE — TELEPHONE ENCOUNTER
Discussed using calmoseptine or remedy calazime protectant ointment. Verbalized understanding. Also discussed referral for home health. Wife agreeable. Will refer to [facility] since they have bridge program

## 2020-01-10 NOTE — TELEPHONE ENCOUNTER
----- Message from Toñito Allen sent at 1/10/2020 11:41 AM CST -----  Contact: wife  Pt has bed sores on buttocks.  Wanting a cream to be called in for this or a recommendation of OTC cream medicine for him to use.    Pharmacy:  [facility]

## 2020-01-10 NOTE — PROGRESS NOTES
Hematology Oncology Progress Note    Hematology Oncology  [facility]     PATIENT: Gerry Powell  : 1935 84 y.o. male  MRN [id number]     PCP: Jayce Guerra MD  Consult Requested By:  No att. providers found    Date of Service: 1/10/2020    Subjective:   Interim History:  Lung Cancer (upper lobe of right lung )    Gerry Powell is here for to followup  The patient developed fever and then coughing diarrhea Miguel draining for about 24 hr.  The patient was sent to emergency room for further evaluation and the patient was given IV fluid replacement with improvement.  He has no more diarrhea.  He is to have coughing.  Oncology History:     Malignant neoplasm of upper lobe of right lung    10/16/2019 Initial Diagnosis     Malignant neoplasm of upper lobe of right lung      10/23/2019 - 12/15/2019 Chemotherapy     Treatment Summary   Plan Name: OP NSCLC PACLITAXEL + CARBOPLATIN (AUC) QW + RADIATION  Treatment Goal: Curative  Status: Inactive  Start Date: 10/23/2019  End Date: 2019  Provider: Jeny Ordoñez NP  Chemotherapy: CARBOplatin (PARAPLATIN) in sodium chloride 0.9% 250 mL chemo infusion,  (original dose 166.2 mg), Intravenous, Clinic/HOD 1 time, 4 of 6 cycles  Dose modification:   (original dose 166.2 mg, Cycle 1, Reason: MD Discretion)  PACLitaxel (TAXOL) 45 mg/m2 = 84 mg in sodium chloride 0.9% 250 mL chemo infusion, 45 mg/m2 = 84 mg, Intravenous, Clinic/HOD 1 time, 4 of 6 cycles      2019 -  Chemotherapy     Treatment Summary   Plan Name: OP DURVALUMAB Q2W  Treatment Goal: Curative  Status: Active  Start Date: 2019  End Date: 2020 (Planned)  Provider: Jeny Ordoñez NP  Chemotherapy: durvalumab (IMFINZI) 635 mg in sodium chloride 0.9% 262.7 mL chemo infusion, 10 mg/kg = 635 mg, Intravenous, Clinic/HOD 1 time, 1 of 26 cycles         Oncologic History:   This is a 83 y.o. male patient with a history of There were no encounter diagnoses., who is referred to Hematology Oncology  for newly diagnosed non-small cell lung cancer.  The patient's was incidentally found to have a left lung mass on a CT scan prior to planned hernia surgery.  Upon further questioning, the patient stated that he has left lower chest pain for several months which is not severe.  He also lost about 25 lb in the past several years.  The patient denies coughing short of breath.  He has no headache.     The patient underwent bronchoscopy with biopsy which shows non-small-cell lung cancer and no further classification was given.  A PET scan was ordered which shows a large hypermetabolic next chronic lesion in the left lower lobe measuring 10 cm and encasing the descending aorta and invading the healing a.m. and mediastinum.  There was also 1 cm hypermetabolic lesion in the right neck close to the thyroid.  MRI of the brain demonstrated no evidence of intracranial metastatic disease unfortunately he has the asymptomatic infarct.     The patient is ex-smoker and decreased smoking 1985.  --XRT/Car/Taxol 10/25/2019                  Oncologic History     Oncologic Treatment     Pathology      Past Medical History:   Past Medical History:   Diagnosis Date    Arthritis     Cancer     Cataract     removed 2011    Functional diarrhea 1/10/2020    GERD (gastroesophageal reflux disease)     Heart murmur     Immunotherapy encounter 1/10/2020    Lung cancer 09/2019    Neuromuscular disorder     neuropathy in feet    Prostate cancer 2007    Radiation seeds implanted    Stroke     patient unaware when this happened. Showed on MRI       Past Surgical HIstory:   Past Surgical History:   Procedure Laterality Date    CATARACT EXTRACTION, BILATERAL  2011    HERNIA REPAIR  2016       Allergies:  Review of patient's allergies indicates:  No Known Allergies    Medications:  Current Outpatient Medications   Medication Sig Dispense Refill    fentaNYL (DURAGESIC) 25 mcg/hr Place 1 patch onto the skin every 72 hours. 5 patch 0     gabapentin (NEURONTIN) 100 MG capsule TK TWO CS PO  TID  0    HYDROmorphone (DILAUDID) 4 MG tablet TK 1 T PO Q 4 H PRN  0    LORazepam (ATIVAN) 0.5 MG tablet TK 1 T PO Q 6 H PRN  1    ondansetron (ZOFRAN) 8 MG tablet Take 1 tablet (8 mg total) by mouth every 8 (eight) hours as needed for Nausea. 30 tablet 2    tamsulosin (FLOMAX) 0.4 mg Cap TK 1 C PO QD  2    phenylephrine-DM-guaifenesin (ROBITUSSIN COUGH AND COLD CF) 2.5-5-50 mg/5 mL Liqd Take 5 mLs by mouth 4 (four) times daily as needed.  0    predniSONE (DELTASONE) 50 MG Tab Take 1 tablet (50 mg total) by mouth once daily. 30 tablet 0     No current facility-administered medications for this visit.        Family History:   Family History   Problem Relation Age of Onset    Colon cancer Mother     Kidney cancer Father     Bladder Cancer Father     Lung cancer Sister     Lymphoma Brother     No Known Problems Maternal Aunt     No Known Problems Maternal Uncle     No Known Problems Paternal Aunt     No Known Problems Paternal Uncle     No Known Problems Maternal Grandmother     No Known Problems Maternal Grandfather     No Known Problems Paternal Grandmother     No Known Problems Paternal Grandfather     Cancer Brother        Social History:  reports that he quit smoking about 35 years ago. His smoking use included cigarettes. He started smoking about 65 years ago. He has a 30.00 pack-year smoking history. He has never used smokeless tobacco. He reports that he does not drink alcohol or use drugs.    Review of Systemas  Constitutional: No change in weight, appetie, fatigue, fever, or night sweats  Eyes: No changes in vision  Ears, Nose, Mouth, Throat, and Face: No hearing problems, ear pain, rummy nose, or sore throat  Respiratory: No shortness of breath or cough  Cardiovascular: No chest pain, palpitations or dizziness  Gastrointestinal: No abdominal pain, hematochezia, melena  Genitourinary: No dysuria, hematuria, nocturia, menstrual problems,  "post menopausal  Integumentary/Breast: No rashes or itching  Hematologic/Lymphatic: No anemia or bleeding abnormalities  Musculoskeletal: No joint or muscle abnormalities  Neurological: No sensory or motor problems, no headaches  Behavioral/Psych: No depression, anxiety, cognitive problems, or stress  Endocrine: No diabetes or thyroid problems  Allergic/Immunologic: See allergy above    Physical Exam      Vitals:   Vitals:    01/10/20 0955   BP: (!) 163/77   BP Location: Left arm   Patient Position: Sitting   BP Method: Large (Automatic)   Pulse: 80   Resp: 14   Temp: 97.6 °F (36.4 °C)   TempSrc: Temporal   SpO2: (!) 92%   Weight: 62.3 kg (137 lb 6.4 oz)   Height: 5' 9" (1.753 m)     BMI: Body mass index is 20.29 kg/m².  BSA Body surface area is 1.74 meters squared.  ECOG Performance Status:   ECOG SCORE         GENERAL APPEARANCE: Well developed, well nourished, in no acute distress.  SKIN: Inspection of the skin reveals no rashes, ulcerations or petechiae.  HEENT: The sclerae were anicteric and conjunctivae were pink and moist. Extraocular movements were intact and pupils were equal, round, and reactive to light with normal accommodation. External inspection of the ears and nose showed no scars, lesions, or masses. Lips, teeth, and gums showed normal mucosa. The oral mucosa, hard and soft palate, tongue and posterior pharynx were normal.  NECK: Supple and symmetric. There was no thyroid enlargement, and no tenderness, or masses were felt.  CRESPIRATORY: Normal AP diameter and normal contour without any kyphoscoliosis. LUNGS: Auscultation of the lungs revealed normal breath sounds without any other adventitious sounds or rubs.  CARDIOVASCULAR: There was a regular rate and rhythm without any murmurs, gallops, rubs. The carotid pulses were normal and 2+ bilaterally without bruits. Peripheral pulses were 2+ and symmetric.  GASTROINTESTINAL: Soft and nontender with normal bowel sounds. The liver span was approximately " 5-6 cm in the right midclavicular line by percussion. The liver edge was nontender. The spleen was not palpable. There were no inguinal or umbilical hernias noted. No ascites was noted.  LYMPH NODES: No lymphadenopathy was appreciated in the neck, axillae or groin.  MUSCULOSKELETAL: Gait was normal. There was no tenderness or effusions noted. Muscle strength and tone were normal. EXTREMITIES: No cyanosis, clubbing or edema.  NEUROLOGIC: Alert and oriented x 3. Normal affect. Gait was normal. Normal deep tendon reflexes with no pathological reflexes. Sensation to touch was normal.  PSYCHIATRIC: good mood, orientated to place, time and person     Labs and Imagings     No visits with results within 12 Month(s) from this visit.   Latest known visit with results is:   No results found for any previous visit.       Imaging:     Assessment:     Principle Problem: No primary diagnosis found.   Co-morbidity/Active Problems:   Patient Active Problem List   Diagnosis    Malignant neoplasm of upper lobe of right lung    Immunotherapy encounter    Functional diarrhea        Gerry Powell is a 84 y.o. male with PMH of Diagnoses of Malignant neoplasm of upper lobe of right lung, Immunotherapy encounter, and Functional diarrhea were pertinent to this visit., with No primary diagnosis found.     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  We can resume immunotherapy after patient's symptoms resolved about a month later       == HOLD Durvalumab next cycle due  January  10  == Labs: CBC CMP  next week  == start steroid prednisone 50 mg p.o. daily  ==Return to Clinic with appointment 1 week  Signature    William Arguello M.D., Ph.D., [facility]  Hematology-Oncology    Signed 1/10/2020 9:44 AM   Note Ends Here

## 2020-01-13 ENCOUNTER — TELEPHONE (OUTPATIENT)
Dept: HEMATOLOGY/ONCOLOGY | Facility: CLINIC | Age: 85
End: 2020-01-13

## 2020-01-17 ENCOUNTER — OFFICE VISIT (OUTPATIENT)
Dept: HEMATOLOGY/ONCOLOGY | Facility: CLINIC | Age: 85
End: 2020-01-17
Payer: MEDICARE

## 2020-01-17 VITALS
SYSTOLIC BLOOD PRESSURE: 145 MMHG | WEIGHT: 135.19 LBS | HEIGHT: 69 IN | BODY MASS INDEX: 20.02 KG/M2 | HEART RATE: 81 BPM | TEMPERATURE: 98 F | DIASTOLIC BLOOD PRESSURE: 72 MMHG | RESPIRATION RATE: 14 BRPM | OXYGEN SATURATION: 96 %

## 2020-01-17 DIAGNOSIS — R53.82 CHRONIC FATIGUE: Chronic | ICD-10-CM

## 2020-01-17 DIAGNOSIS — C34.02 MALIGNANT NEOPLASM OF HILUS OF LEFT LUNG: Primary | ICD-10-CM

## 2020-01-17 DIAGNOSIS — D63.0 ANEMIA IN NEOPLASTIC DISEASE: ICD-10-CM

## 2020-01-17 DIAGNOSIS — D51.0 PERNICIOUS ANEMIA: ICD-10-CM

## 2020-01-17 PROCEDURE — 1159F MED LIST DOCD IN RCRD: CPT | Mod: S$GLB,,, | Performed by: INTERNAL MEDICINE

## 2020-01-17 PROCEDURE — 1159F PR MEDICATION LIST DOCUMENTED IN MEDICAL RECORD: ICD-10-PCS | Mod: S$GLB,,, | Performed by: INTERNAL MEDICINE

## 2020-01-17 PROCEDURE — 99214 PR OFFICE/OUTPT VISIT, EST, LEVL IV, 30-39 MIN: ICD-10-PCS | Mod: S$GLB,,, | Performed by: INTERNAL MEDICINE

## 2020-01-17 PROCEDURE — 99214 OFFICE O/P EST MOD 30 MIN: CPT | Mod: S$GLB,,, | Performed by: INTERNAL MEDICINE

## 2020-01-17 PROCEDURE — 1125F AMNT PAIN NOTED PAIN PRSNT: CPT | Mod: S$GLB,,, | Performed by: INTERNAL MEDICINE

## 2020-01-17 PROCEDURE — 1125F PR PAIN SEVERITY QUANTIFIED, PAIN PRESENT: ICD-10-PCS | Mod: S$GLB,,, | Performed by: INTERNAL MEDICINE

## 2020-01-17 RX ORDER — SODIUM CHLORIDE 0.9 % (FLUSH) 0.9 %
10 SYRINGE (ML) INJECTION
Status: CANCELLED | OUTPATIENT
Start: 2020-01-17

## 2020-01-17 RX ORDER — LANOLIN ALCOHOL/MO/W.PET/CERES
100 CREAM (GRAM) TOPICAL 3 TIMES DAILY
Status: SHIPPED | OUTPATIENT
Start: 2020-01-17 | End: 2020-02-16

## 2020-01-17 RX ORDER — HEPARIN 100 UNIT/ML
500 SYRINGE INTRAVENOUS
Status: CANCELLED | OUTPATIENT
Start: 2020-01-17

## 2020-01-17 RX ORDER — PYRIDOXINE HCL (VITAMIN B6) 100 MG
100 TABLET ORAL DAILY
Qty: 90 TABLET | Refills: 3 | Status: SHIPPED | OUTPATIENT
Start: 2020-01-17 | End: 2021-01-16

## 2020-01-17 NOTE — PROGRESS NOTES
Hematology Oncology Progress Note    Hematology Oncology  [facility]     PATIENT: Gerry Powell  : 1935 84 y.o. male  MRN [id number]     PCP: Jayce Guerra MD  Consult Requested By:  No att. providers found    Date of Service: 2020    Subjective:   Interim History:  Lung Cancer (Malignant neoplasm of lobe of left lung)    Gerry Powell is here for to followup  The patient developed fever and then coughing diarrhea Miguel draining for about 24 hr.  The patient was sent to emergency room for further evaluation and the patient was given IV fluid replacement with improvement.  He has no more diarrhea.  He is to have coughing.  Oncology History:     Malignant neoplasm of upper lobe of right lung    10/16/2019 Initial Diagnosis     Malignant neoplasm of upper lobe of right lung      10/23/2019 - 12/15/2019 Chemotherapy     Treatment Summary   Plan Name: OP NSCLC PACLITAXEL + CARBOPLATIN (AUC) QW + RADIATION  Treatment Goal: Curative  Status: Inactive  Start Date: 10/23/2019  End Date: 2019  Provider: Jeny Ordoñez NP  Chemotherapy: CARBOplatin (PARAPLATIN) in sodium chloride 0.9% 250 mL chemo infusion,  (original dose 166.2 mg), Intravenous, Clinic/HOD 1 time, 4 of 6 cycles  Dose modification:   (original dose 166.2 mg, Cycle 1, Reason: MD Discretion)  PACLitaxel (TAXOL) 45 mg/m2 = 84 mg in sodium chloride 0.9% 250 mL chemo infusion, 45 mg/m2 = 84 mg, Intravenous, Clinic/HOD 1 time, 4 of 6 cycles      2019 -  Chemotherapy     Treatment Summary   Plan Name: OP DURVALUMAB Q2W  Treatment Goal: Curative  Status: Active  Start Date: 2019  End Date: 2020 (Planned)  Provider: Jeny Ordoñez NP  Chemotherapy: durvalumab (IMFINZI) 635 mg in sodium chloride 0.9% 262.7 mL chemo infusion, 10 mg/kg = 635 mg, Intravenous, Clinic/HOD 1 time, 1 of 26 cycles         Oncologic History:   This is a 83 y.o. male patient with a history of There were no encounter diagnoses., who is referred to  Hematology Oncology for newly diagnosed non-small cell lung cancer.  The patient's was incidentally found to have a left lung mass on a CT scan prior to planned hernia surgery.  Upon further questioning, the patient stated that he has left lower chest pain for several months which is not severe.  He also lost about 25 lb in the past several years.  The patient denies coughing short of breath.  He has no headache.     The patient underwent bronchoscopy with biopsy which shows non-small-cell lung cancer and no further classification was given.  A PET scan was ordered which shows a large hypermetabolic next chronic lesion in the left lower lobe measuring 10 cm and encasing the descending aorta and invading the healing a.m. and mediastinum.  There was also 1 cm hypermetabolic lesion in the right neck close to the thyroid.  MRI of the brain demonstrated no evidence of intracranial metastatic disease unfortunately he has the asymptomatic infarct.     The patient is ex-smoker and decreased smoking 1985.  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 1/17/2020    Prostate cancer 2007    Radiation seeds implanted    Stroke     patient unaware when this happened. Showed on MRI       Past Surgical HIstory:   Past Surgical History:   Procedure Laterality Date    CATARACT EXTRACTION, BILATERAL  2011    HERNIA REPAIR  2016       Allergies:  Review of patient's allergies indicates:  No Known Allergies    Medications:  Current Outpatient Medications    Medication Sig Dispense Refill    fentaNYL (DURAGESIC) 25 mcg/hr Place 1 patch onto the skin every 72 hours. 5 patch 0    gabapentin (NEURONTIN) 100 MG capsule TK TWO CS PO  TID  0    HYDROmorphone (DILAUDID) 4 MG tablet TK 1 T PO Q 4 H PRN  0    LORazepam (ATIVAN) 0.5 MG tablet TK 1 T PO Q 6 H PRN  1    ondansetron (ZOFRAN) 8 MG tablet Take 1 tablet (8 mg total) by mouth every 8 (eight) hours as needed for Nausea. 30 tablet 2    phenylephrine-DM-guaifenesin (ROBITUSSIN COUGH AND COLD CF) 2.5-5-50 mg/5 mL Liqd Take 5 mLs by mouth 4 (four) times daily as needed.  0    predniSONE (DELTASONE) 50 MG Tab Take 1 tablet (50 mg total) by mouth once daily. 30 tablet 0    tamsulosin (FLOMAX) 0.4 mg Cap TK 1 C PO QD  2    pyridoxine, vitamin B6, (VITAMIN B-6) 100 MG Tab Take 1 tablet (100 mg total) by mouth once daily. 90 tablet 3     Current Facility-Administered Medications   Medication Dose Route Frequency Provider Last Rate Last Dose    thiamine tablet 100 mg  100 mg Oral TID William Arguello MD           Family History:   Family History   Problem Relation Age of Onset    Colon cancer Mother     Kidney cancer Father     Bladder Cancer Father     Lung cancer Sister     Lymphoma Brother     No Known Problems Maternal Aunt     No Known Problems Maternal Uncle     No Known Problems Paternal Aunt     No Known Problems Paternal Uncle     No Known Problems Maternal Grandmother     No Known Problems Maternal Grandfather     No Known Problems Paternal Grandmother     No Known Problems Paternal Grandfather     Cancer Brother        Social History:  reports that he quit smoking about 35 years ago. His smoking use included cigarettes. He started smoking about 65 years ago. He has a 30.00 pack-year smoking history. He has never used smokeless tobacco. He reports that he does not drink alcohol or use drugs.    Review of Systemas  Constitutional: No change in weight, appetie, fatigue, fever, or night sweats  Eyes:  "No changes in vision  Ears, Nose, Mouth, Throat, and Face: No hearing problems, ear pain, rummy nose, or sore throat  Respiratory: No shortness of breath or cough  Cardiovascular: No chest pain, palpitations or dizziness  Gastrointestinal: No abdominal pain, hematochezia, melena  Genitourinary: No dysuria, hematuria, nocturia, menstrual problems, post menopausal  Integumentary/Breast: No rashes or itching  Hematologic/Lymphatic: No anemia or bleeding abnormalities  Musculoskeletal: No joint or muscle abnormalities  Neurological: No sensory or motor problems, no headaches  Behavioral/Psych: No depression, anxiety, cognitive problems, or stress  Endocrine: No diabetes or thyroid problems  Allergic/Immunologic: See allergy above    Physical Exam      Vitals:   Vitals:    01/17/20 1129   BP: (!) 145/72   BP Location: Right arm   Patient Position: Sitting   BP Method: Large (Automatic)   Pulse: 81   Resp: 14   Temp: 98 °F (36.7 °C)   TempSrc: Temporal   SpO2: 96%   Weight: 61.3 kg (135 lb 3.2 oz)   Height: 5' 9" (1.753 m)     BMI: Body mass index is 19.97 kg/m².  BSA Body surface area is 1.73 meters squared.  ECOG Performance Status:   ECOG SCORE    1 - Restricted in strenuous activity-ambulatory and able to carry out work of a light nature       GENERAL APPEARANCE: Well developed, well nourished, in no acute distress.  SKIN: Inspection of the skin reveals no rashes, ulcerations or petechiae.  HEENT: The sclerae were anicteric and conjunctivae were pink and moist. Extraocular movements were intact and pupils were equal, round, and reactive to light with normal accommodation. External inspection of the ears and nose showed no scars, lesions, or masses. Lips, teeth, and gums showed normal mucosa. The oral mucosa, hard and soft palate, tongue and posterior pharynx were normal.  NECK: Supple and symmetric. There was no thyroid enlargement, and no tenderness, or masses were felt.  CRESPIRATORY: Normal AP diameter and normal " contour without any kyphoscoliosis. LUNGS: Auscultation of the lungs revealed normal breath sounds without any other adventitious sounds or rubs.  CARDIOVASCULAR: There was a regular rate and rhythm without any murmurs, gallops, rubs. The carotid pulses were normal and 2+ bilaterally without bruits. Peripheral pulses were 2+ and symmetric.  GASTROINTESTINAL: Soft and nontender with normal bowel sounds. The liver span was approximately 5-6 cm in the right midclavicular line by percussion. The liver edge was nontender. The spleen was not palpable. There were no inguinal or umbilical hernias noted. No ascites was noted.  LYMPH NODES: No lymphadenopathy was appreciated in the neck, axillae or groin.  MUSCULOSKELETAL: Gait was normal. There was no tenderness or effusions noted. Muscle strength and tone were normal. EXTREMITIES: No cyanosis, clubbing or edema.  NEUROLOGIC: Alert and oriented x 3. Normal affect. Gait was normal. Normal deep tendon reflexes with no pathological reflexes. Sensation to touch was normal.  PSYCHIATRIC: good mood, orientated to place, time and person     Labs and Imagings     No visits with results within 12 Month(s) from this visit.   Latest known visit with results is:   No results found for any previous visit.       Imaging:     Assessment:     Principle Problem: Malignant neoplasm of hilus of left lung [C34.02]   Co-morbidity/Active Problems:   Patient Active Problem List   Diagnosis    Malignant neoplasm of upper lobe of right lung    Immunotherapy encounter    Functional diarrhea    Anemia in neoplastic disease    Chronic fatigue    Pernicious anemia?        Gerry Powell is a 84 y.o. male with PMH of The primary encounter diagnosis was Malignant neoplasm of hilus of left lung. Diagnoses of Anemia in neoplastic disease, Chronic fatigue, and Pernicious anemia? needs r/o were also pertinent to this visit., with Malignant neoplasm of hilus of left lung [C34.02]     Non-small-cell lung  cancer with EGFR ALK Ros1 negative molecular features; there was no enough specimen to stain PDL1     There was no distant metastatic disease   the biopsy of  Thyroid  shows papillary carcinoma  ECOG 0-1  Dehydration improved  Anorexia improved  Mild leukopenia   Rule out colitis due to immune therapy  Plan:   Overall Plan:  Chemoradiation therapy followed by adjuvant immunotherapy  Durvalumab Imfinzi® every 2 weeks for 12 months  For now and hold the immunotherapy and start steroid.  We can resume immunotherapy after patient's symptoms resolved about a month later       == HOLD Durvalumab next cycle due  January 10  == Labs: CBC CMP  next week  == start steroid prednisone 50 mg p.o. daily  ==Return to Clinic with appointment 1 week  Signature    William Arguello M.D., Ph.D., [facility]  Hematology-Oncology    Signed 1/17/2020 9:44 AM   Note Ends Here

## 2020-01-24 ENCOUNTER — TELEPHONE (OUTPATIENT)
Dept: HEMATOLOGY/ONCOLOGY | Facility: CLINIC | Age: 85
End: 2020-01-24

## 2020-01-24 DIAGNOSIS — B37.0 THRUSH, ORAL: ICD-10-CM

## 2020-01-24 DIAGNOSIS — C34.02 MALIGNANT NEOPLASM OF HILUS OF LEFT LUNG: Primary | ICD-10-CM

## 2020-01-24 RX ORDER — FLUCONAZOLE 100 MG/1
100 TABLET ORAL DAILY
Qty: 14 TABLET | Refills: 0 | Status: SHIPPED | OUTPATIENT
Start: 2020-01-24 | End: 2020-01-31

## 2020-01-24 NOTE — TELEPHONE ENCOUNTER
We received a call from home health. Pt has some thresh going on covering his tongue. Also wanted to see if he can take the flu shot.

## 2020-01-29 ENCOUNTER — TELEPHONE (OUTPATIENT)
Dept: HEMATOLOGY/ONCOLOGY | Facility: CLINIC | Age: 85
End: 2020-01-29

## 2020-01-29 DIAGNOSIS — Z79.899 ENCOUNTER FOR LONG-TERM (CURRENT) DRUG USE: ICD-10-CM

## 2020-01-29 DIAGNOSIS — C34.11 MALIGNANT NEOPLASM OF UPPER LOBE OF RIGHT LUNG: Primary | ICD-10-CM

## 2020-01-29 DIAGNOSIS — Z29.89 IMMUNOTHERAPY ENCOUNTER: ICD-10-CM

## 2020-01-29 NOTE — TELEPHONE ENCOUNTER
Patient's wife called regarding patient and how he is feeling. Will not make it in for appointment today. Patient is complaining of right shoulder pain, but wife reports he has been laying on it. Patient is also not eating or drinking much. He has been laying around. She reports he has a sore to his bottom that they have been taking care of, and it has not worsened. She also reports patient has a sore in his mouth that has gotten worse regardless of what they do. She reports he is taking dilaudid q4h, but doesn't seem to be lasting the full time.    Would you like for me to have  draw some bloodwork?

## 2020-01-29 NOTE — TELEPHONE ENCOUNTER
Patient is ready to transition to hospice. He would like to go with [facility] since he is established with their home health.

## 2020-01-31 NOTE — TELEPHONE ENCOUNTER
Spoke to patient's daughter 1/30/2020. Patient does not want to come in for fluids. Hospice is keeping patient comfortable.

## 2022-07-10 NOTE — PROGRESS NOTES
Hematology Oncology Progress Note    Hematology Oncology  [facility]     PATIENT: Gerry Powell  : 1935 84 y.o. male  MRN [id number]     PCP: Jayce Guerra MD  Consult Requested By:  No att. providers found    Date of Service: 10/30/2019    Subjective:   Interim History:  Lung Cancer (upper lobe of right lung )    Gerry Powell is here for to followup to start chemoxrt. Doing well. No dysphagia      Oncology History:     Malignant neoplasm of upper lobe of right lung    10/16/2019 Initial Diagnosis     Malignant neoplasm of upper lobe of right lung      10/23/2019 -  Chemotherapy     Treatment Summary   Plan Name: OP NSCLC PACLITAXEL + CARBOPLATIN (AUC) QW + RADIATION  Treatment Goal: Curative  Status: Active  Start Date: 10/23/2019  End Date: 2019 (Planned)  Provider: Jeny Ordoñez NP  Chemotherapy: CARBOplatin (PARAPLATIN) in sodium chloride 0.9% 250 mL chemo infusion,  (original dose 166.2 mg), Intravenous, Clinic/HOD 1 time, 1 of 6 cycles  Dose modification:   (original dose 166.2 mg, Cycle 1, Reason: MD Discretion)  PACLitaxel (TAXOL) 45 mg/m2 = 84 mg in sodium chloride 0.9% 250 mL chemo infusion, 45 mg/m2 = 84 mg, Intravenous, Clinic/HOD 1 time, 1 of 6 cycles         Oncologic History:   This is a 83 y.o. male patient with a history of There were no encounter diagnoses., who is referred to Hematology Oncology for newly diagnosed non-small cell lung cancer.  The patient's was incidentally found to have a left lung mass on a CT scan prior to planned hernia surgery.  Upon further questioning, the patient stated that he has left lower chest pain for several months which is not severe.  He also lost about 25 lb in the past several years.  The patient denies coughing short of breath.  He has no headache.     The patient underwent bronchoscopy with biopsy which shows non-small-cell lung cancer and no further classification was given.  A PET scan was ordered which shows a large hypermetabolic  next chronic lesion in the left lower lobe measuring 10 cm and encasing the descending aorta and invading the healing a.m. and mediastinum.  There was also 1 cm hypermetabolic lesion in the right neck close to the thyroid.  MRI of the brain demonstrated no evidence of intracranial metastatic disease unfortunately he has the asymptomatic infarct.     The patient is ex-smoker and decreased smoking 1985.  --XRT/Car/Taxol 10/25/2019                  Oncologic History     Oncologic Treatment     Pathology      Past Medical History:   Past Medical History:   Diagnosis Date    Arthritis     Cataract     removed 2011    GERD (gastroesophageal reflux disease)     Heart murmur     Lung cancer 09/2019    Neuromuscular disorder     neuropathy in feet    Prostate cancer 2007    Radiation seeds implanted    Stroke     patient unaware when this happened. Showed on MRI       Past Surgical HIstory:   Past Surgical History:   Procedure Laterality Date    CATARACT EXTRACTION, BILATERAL  2011    HERNIA REPAIR  2016       Allergies:  Review of patient's allergies indicates:  No Known Allergies    Medications:  Current Outpatient Medications   Medication Sig Dispense Refill    fentaNYL (DURAGESIC) 25 mcg/hr UNW AND JESSICA 1 PA TO SKIN Q 72 H  0    gabapentin (NEURONTIN) 100 MG capsule TK TWO CS PO  TID  0    HYDROcodone-acetaminophen (NORCO)  mg per tablet TK 1 T PO  Q 6 H PRN P  0    ondansetron (ZOFRAN) 8 MG tablet Take 1 tablet (8 mg total) by mouth every 8 (eight) hours as needed for Nausea. 30 tablet 2    tamsulosin (FLOMAX) 0.4 mg Cap TK 1 C PO QD  2    oxyCODONE-acetaminophen (PERCOCET) 5-325 mg per tablet TK 1 TO 2 TS PO Q 6 H PRN P  0    traMADol (ULTRAM) 50 mg tablet Take 50 mg by mouth every 8 (eight) hours as needed for Pain.       No current facility-administered medications for this visit.        Family History:   Family History   Problem Relation Age of Onset    Colon cancer Mother     Kidney  "cancer Father     Bladder Cancer Father     Lung cancer Sister     Lymphoma Brother     No Known Problems Maternal Aunt     No Known Problems Maternal Uncle     No Known Problems Paternal Aunt     No Known Problems Paternal Uncle     No Known Problems Maternal Grandmother     No Known Problems Maternal Grandfather     No Known Problems Paternal Grandmother     No Known Problems Paternal Grandfather     Cancer Brother        Social History:  reports that he quit smoking about 34 years ago. His smoking use included cigarettes. He started smoking about 64 years ago. He has a 30.00 pack-year smoking history. He has never used smokeless tobacco. He reports that he does not drink alcohol or use drugs.    Review of Systemas  Constitutional: No change in weight, appetie, fatigue, fever, or night sweats  Eyes: No changes in vision  Ears, Nose, Mouth, Throat, and Face: No hearing problems, ear pain, rummy nose, or sore throat  Respiratory: No shortness of breath or cough  Cardiovascular: No chest pain, palpitations or dizziness  Gastrointestinal: No abdominal pain, hematochezia, melena  Genitourinary: No dysuria, hematuria, nocturia, menstrual problems, post menopausal  Integumentary/Breast: No rashes or itching  Hematologic/Lymphatic: No anemia or bleeding abnormalities  Musculoskeletal: No joint or muscle abnormalities  Neurological: No sensory or motor problems, no headaches  Behavioral/Psych: No depression, anxiety, cognitive problems, or stress  Endocrine: No diabetes or thyroid problems  Allergic/Immunologic: See allergy above    Physical Exam      Vitals:   Vitals:    10/30/19 0912   BP: 139/72   BP Location: Right arm   Patient Position: Sitting   BP Method: Large (Automatic)   Pulse: 70   Resp: 10   Temp: 98 °F (36.7 °C)   TempSrc: Temporal   SpO2: 97%   Weight: 70.8 kg (156 lb)   Height: 5' 9" (1.753 m)     BMI: Body mass index is 23.04 kg/m².  BSA Body surface area is 1.86 meters squared.  ECOG " Performance Status:   ECOG SCORE    0 - Fully active-able to carry on all pre-disease performance without restriction       GENERAL APPEARANCE: Well developed, well nourished, in no acute distress.  SKIN: Inspection of the skin reveals no rashes, ulcerations or petechiae.  HEENT: The sclerae were anicteric and conjunctivae were pink and moist. Extraocular movements were intact and pupils were equal, round, and reactive to light with normal accommodation. External inspection of the ears and nose showed no scars, lesions, or masses. Lips, teeth, and gums showed normal mucosa. The oral mucosa, hard and soft palate, tongue and posterior pharynx were normal.  NECK: Supple and symmetric. There was no thyroid enlargement, and no tenderness, or masses were felt.  CRESPIRATORY: Normal AP diameter and normal contour without any kyphoscoliosis. LUNGS: Auscultation of the lungs revealed normal breath sounds without any other adventitious sounds or rubs.  CARDIOVASCULAR: There was a regular rate and rhythm without any murmurs, gallops, rubs. The carotid pulses were normal and 2+ bilaterally without bruits. Peripheral pulses were 2+ and symmetric.  GASTROINTESTINAL: Soft and nontender with normal bowel sounds. The liver span was approximately 5-6 cm in the right midclavicular line by percussion. The liver edge was nontender. The spleen was not palpable. There were no inguinal or umbilical hernias noted. No ascites was noted.  LYMPH NODES: No lymphadenopathy was appreciated in the neck, axillae or groin.  MUSCULOSKELETAL: Gait was normal. There was no tenderness or effusions noted. Muscle strength and tone were normal. EXTREMITIES: No cyanosis, clubbing or edema.  NEUROLOGIC: Alert and oriented x 3. Normal affect. Gait was normal. Normal deep tendon reflexes with no pathological reflexes. Sensation to touch was normal.  PSYCHIATRIC: good mood, orientated to place, time and person     Labs and Imagings     No visits with results  within 12 Month(s) from this visit.   Latest known visit with results is:   No results found for any previous visit.       Imaging:     Assessment:     Principle Problem: Malignant neoplasm of upper lobe of right lung [C34.11]   Co-morbidity/Active Problems:   Patient Active Problem List   Diagnosis    Malignant neoplasm of upper lobe of right lung        Gerry Powell is a 84 y.o. 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
